# Patient Record
Sex: FEMALE | Race: WHITE | NOT HISPANIC OR LATINO | Employment: OTHER | URBAN - METROPOLITAN AREA
[De-identification: names, ages, dates, MRNs, and addresses within clinical notes are randomized per-mention and may not be internally consistent; named-entity substitution may affect disease eponyms.]

---

## 2022-06-30 ENCOUNTER — TELEPHONE (OUTPATIENT)
Dept: PAIN MEDICINE | Facility: CLINIC | Age: 87
End: 2022-06-30

## 2022-06-30 ENCOUNTER — CONSULT (OUTPATIENT)
Dept: PAIN MEDICINE | Facility: CLINIC | Age: 87
End: 2022-06-30
Payer: COMMERCIAL

## 2022-06-30 VITALS
WEIGHT: 110 LBS | BODY MASS INDEX: 23.09 KG/M2 | DIASTOLIC BLOOD PRESSURE: 60 MMHG | SYSTOLIC BLOOD PRESSURE: 140 MMHG | HEIGHT: 58 IN | TEMPERATURE: 98.2 F | HEART RATE: 75 BPM | RESPIRATION RATE: 19 BRPM

## 2022-06-30 DIAGNOSIS — M54.16 LUMBAR RADICULOPATHY: ICD-10-CM

## 2022-06-30 DIAGNOSIS — G89.4 CHRONIC PAIN SYNDROME: Primary | ICD-10-CM

## 2022-06-30 DIAGNOSIS — M54.41 CHRONIC BILATERAL LOW BACK PAIN WITH BILATERAL SCIATICA: ICD-10-CM

## 2022-06-30 DIAGNOSIS — M54.42 CHRONIC BILATERAL LOW BACK PAIN WITH BILATERAL SCIATICA: ICD-10-CM

## 2022-06-30 DIAGNOSIS — G89.29 CHRONIC BILATERAL LOW BACK PAIN WITH BILATERAL SCIATICA: ICD-10-CM

## 2022-06-30 DIAGNOSIS — M48.062 SPINAL STENOSIS OF LUMBAR REGION WITH NEUROGENIC CLAUDICATION: ICD-10-CM

## 2022-06-30 PROCEDURE — 99204 OFFICE O/P NEW MOD 45 MIN: CPT | Performed by: ANESTHESIOLOGY

## 2022-06-30 RX ORDER — AMIODARONE HYDROCHLORIDE 100 MG/1
100 TABLET ORAL DAILY
COMMUNITY

## 2022-06-30 RX ORDER — MELATONIN
1000 DAILY
COMMUNITY

## 2022-06-30 RX ORDER — VITAMIN E 268 MG
400 CAPSULE ORAL DAILY
COMMUNITY

## 2022-06-30 RX ORDER — UBIDECARENONE 75 MG
CAPSULE ORAL DAILY
COMMUNITY

## 2022-06-30 RX ORDER — FUROSEMIDE 40 MG/1
40 TABLET ORAL 2 TIMES DAILY
COMMUNITY

## 2022-06-30 RX ORDER — CLOPIDOGREL BISULFATE 75 MG/1
75 TABLET ORAL DAILY
COMMUNITY

## 2022-06-30 NOTE — PROGRESS NOTES
Assessment:  1  Chronic pain syndrome    2  Chronic bilateral low back pain with bilateral sciatica    3  Lumbar radiculopathy - Bilateral    4  Spinal stenosis of lumbar region with neurogenic claudication        Plan:  New Medications Ordered This Visit   Medications    amiodarone 100 mg tablet     Sig: Take 100 mg by mouth daily    furosemide (LASIX) 40 mg tablet     Sig: Take 40 mg by mouth 2 (two) times a day    clopidogrel (PLAVIX) 75 mg tablet     Sig: Take 75 mg by mouth daily    cyanocobalamin (VITAMIN B-12) 100 mcg tablet     Sig: Take by mouth daily    vitamin E, tocopherol, 400 units capsule     Sig: Take 400 Units by mouth daily    cholecalciferol (VITAMIN D3) 1,000 units tablet     Sig: Take 1,000 Units by mouth daily       My impressions and treatment recommendations were discussed in detail with the patient, who verbalized understanding and had no further questions  Patient presents the office with low back and bilateral leg pain  Patient has been evaluated by spinal surgeon, at this time the patient does not want to consider surgery  She is here to be evaluated for epidural injections  Exam and imaging correlate, therefore I find it reasonable to perform an L5-S1 lumbar epidural steroid injection  I educated patient that these injections will only help with the pain, and not with many symptoms that she is experiencing such as but not limited to any bladder or bowel incontinence, saddle numbness, and weakness  Patient is agreeable for an L5-S1 lumbar epidural steroid injection  Complete risks and benefits including bleeding, infection, tissue reaction, nerve injury and allergic reaction were discussed  The approach was demonstrated using models and literature was provided  Verbal and written consent was obtained  Follow-up is planned in 4 weeks after injection time or sooner as warranted  Discharge instructions were provided   I personally saw and examined the patient and I agree with the above discussed plan of care  History of Present Illness:    Preeti Bolden is a 80 y o  female who presents to [facility] for initial evaluation of the above stated pain complaints  The patient has a past medical and chronic pain history as outlined in the assessment section  She was referred by herself  Patient presents to the office with low back and bilateral leg pain that began 5 months ago  She states that the intensity of her pain has been moderate to severe with a pain rating of 9/10 on the verbal numeric pain scale  Her pain is nearly constantly in nature with 60 to 95% of the time  She describes her pain as burning, sharp, pins and needles, and pressure like  She states she has weakness in her lower extremities  She currently uses a cane or walker to help with ambulation  Patient states that her pain increases with Laying down, standing, bending, sitting, walking, exercise, relaxation, coughing, sneezing, and with bowel movements  Patient states that physical therapy and heat therapy have not provided any relief  Patient is currently using Tylenol and lidocaine patches which only provided temporary relief  Patient does not smoke tobacco or marijuana  Patient does not consume alcohol  She is currently on a blood thinning medication  And she does not have an allergy to latex  Patient has been evaluated by spinal surgery due to the patient 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  Patient states that she does have some stress incontinence and has noticed that she needs to be close to the bathroom when she feels the sudden urge to defecate otherwise she feels she may lose her bowels  Patient also does have saddle numbness and on exam she has left leg weakness  But at this time the patient does not want to have surgery she would like to try an epidural steroid injection to see if this would help with her pain      I educated patient that these injections will only help with the pain, and not with many symptoms that she is experiencing such as but not limited to any bladder or bowel incontinence, saddle numbness, and weakness  Patient is agreeable for an L5-S1 lumbar epidural steroid injection  Review of Systems:    Review of Systems   Constitutional: Negative for chills and fatigue  HENT: Positive for hearing loss  Negative for ear pain, mouth sores and sinus pressure  Eyes: Negative for pain, redness and visual disturbance  Respiratory: Positive for shortness of breath  Negative for wheezing  Cardiovascular: Positive for leg swelling  Negative for chest pain and palpitations  Gastrointestinal: Negative for abdominal pain and nausea  Endocrine: Negative for polyphagia  Genitourinary: Positive for frequency  Musculoskeletal: Positive for gait problem  Negative for arthralgias, back pain and neck pain  MUSCLE PAIN BOTH SIDES   Skin: Positive for rash  Negative for wound  Neurological: Negative for seizures and weakness  Psychiatric/Behavioral: Positive for dysphoric mood  Negative for sleep disturbance  There is no problem list on file for this patient  History reviewed  No pertinent past medical history  No past surgical history on file      Family History   Problem Relation Age of Onset    No Known Problems Mother     No Known Problems Father        Social History     Occupational History    Not on file   Tobacco Use    Smoking status: Never Smoker    Smokeless tobacco: Not on file   Vaping Use    Vaping Use: Never used   Substance and Sexual Activity    Alcohol use: Not Currently     Alcohol/week: 1 0 standard drink     Types: 1 Glasses of wine per week    Drug use: Never    Sexual activity: Not Currently         Current Outpatient Medications:     amiodarone 100 mg tablet, Take 100 mg by mouth daily, Disp: , Rfl:     cholecalciferol (VITAMIN D3) 1,000 units tablet, Take 1,000 Units by mouth daily, Disp: , Rfl:     clopidogrel (PLAVIX) 75 mg tablet, Take 75 mg by mouth daily, Disp: , Rfl:     cyanocobalamin (VITAMIN B-12) 100 mcg tablet, Take by mouth daily, Disp: , Rfl:     furosemide (LASIX) 40 mg tablet, Take 40 mg by mouth 2 (two) times a day, Disp: , Rfl:     vitamin E, tocopherol, 400 units capsule, Take 400 Units by mouth daily, Disp: , Rfl:     Not on File    Physical Exam:    /60   Pulse 75   Temp 98 2 °F (36 8 °C)   Resp 19   Ht 4' 10" (1 473 m)   Wt 49 9 kg (110 lb)   BMI 22 99 kg/m²     Constitutional: normal, well developed, well nourished, alert, in no distress and non-toxic and no overt pain behavior    Eyes: anicteric  HEENT: hard of hearing  Neck: supple, symmetric, trachea midline and no masses   Pulmonary:even and unlabored  Cardiovascular:No edema or pitting edema present  Skin:Normal without rashes or lesions and well hydrated  Psychiatric:Mood and affect appropriate  Neurologic:Cranial Nerves II-XII grossly intact  Musculoskeletal:antalgic and using rolator     Lumbar Spine Exam    Appearance:  Normal lordosis  Palpation/Tenderness:  left lumbar paraspinal tenderness  right lumbar paraspinal tenderness  Sensory:  diminished pin prick sensation, location: complete saddle numbness, pins and needles bilateral lower legs  Range of Motion:  Flexion:  No limitation  without pain  Extension:  Minimally limited  with pain  Lateral Flexion - Left:  Minimally limited  with pain  Lateral Flexion - Right:  No limitation  without pain  Rotation - Left:  No limitation  without pain  Rotation - Right:  No limitation  without pain  Motor Strength:  Left hip flexion:  4/5  Left hip extension:  4/5  Right hip flexion:  5/5  Right hip extension:  5/5  Left knee flexion:  5/5  Left knee extension:  5/5  Right knee flexion:  5/5  Reflexes:  Left Patellar:  absent   Right Patellar:  absent   Left Achilles:  1+   Right Achilles:  1+   Special Tests:  Left Straight Leg Test: positive  Right Straight Leg Test:  negative  Left Abdi's Maneuver:  positive  Right Abdi's Maneuver:  negative    Imaging      Media Information                    Document Information    Other:  Other   mri report of lumbar spine   06/30/2022   Attached To:   Consult on 6/30/22 with Jam Green MD     Source Information    lEissa Gusman  [facility]       No orders to display       No orders of the defined types were placed in this encounter

## 2022-06-30 NOTE — H&P (VIEW-ONLY)
Assessment:  1  Chronic pain syndrome    2  Chronic bilateral low back pain with bilateral sciatica    3  Lumbar radiculopathy - Bilateral    4  Spinal stenosis of lumbar region with neurogenic claudication        Plan:  New Medications Ordered This Visit   Medications    amiodarone 100 mg tablet     Sig: Take 100 mg by mouth daily    furosemide (LASIX) 40 mg tablet     Sig: Take 40 mg by mouth 2 (two) times a day    clopidogrel (PLAVIX) 75 mg tablet     Sig: Take 75 mg by mouth daily    cyanocobalamin (VITAMIN B-12) 100 mcg tablet     Sig: Take by mouth daily    vitamin E, tocopherol, 400 units capsule     Sig: Take 400 Units by mouth daily    cholecalciferol (VITAMIN D3) 1,000 units tablet     Sig: Take 1,000 Units by mouth daily       My impressions and treatment recommendations were discussed in detail with the patient, who verbalized understanding and had no further questions  Patient presents the office with low back and bilateral leg pain  Patient has been evaluated by spinal surgeon, at this time the patient does not want to consider surgery  She is here to be evaluated for epidural injections  Exam and imaging correlate, therefore I find it reasonable to perform an L5-S1 lumbar epidural steroid injection  I educated patient that these injections will only help with the pain, and not with many symptoms that she is experiencing such as but not limited to any bladder or bowel incontinence, saddle numbness, and weakness  Patient is agreeable for an L5-S1 lumbar epidural steroid injection  Complete risks and benefits including bleeding, infection, tissue reaction, nerve injury and allergic reaction were discussed  The approach was demonstrated using models and literature was provided  Verbal and written consent was obtained  Follow-up is planned in 4 weeks after injection time or sooner as warranted  Discharge instructions were provided   I personally saw and examined the patient and I agree with the above discussed plan of care  History of Present Illness:    Mikhail Riley is a 80 y o  female who presents to [facility] for initial evaluation of the above stated pain complaints  The patient has a past medical and chronic pain history as outlined in the assessment section  She was referred by herself  Patient presents to the office with low back and bilateral leg pain that began 5 months ago  She states that the intensity of her pain has been moderate to severe with a pain rating of 9/10 on the verbal numeric pain scale  Her pain is nearly constantly in nature with 60 to 95% of the time  She describes her pain as burning, sharp, pins and needles, and pressure like  She states she has weakness in her lower extremities  She currently uses a cane or walker to help with ambulation  Patient states that her pain increases with Laying down, standing, bending, sitting, walking, exercise, relaxation, coughing, sneezing, and with bowel movements  Patient states that physical therapy and heat therapy have not provided any relief  Patient is currently using Tylenol and lidocaine patches which only provided temporary relief  Patient does not smoke tobacco or marijuana  Patient does not consume alcohol  She is currently on a blood thinning medication  And she does not have an allergy to latex  Patient has been evaluated by spinal surgery due to the patient 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  Patient states that she does have some stress incontinence and has noticed that she needs to be close to the bathroom when she feels the sudden urge to defecate otherwise she feels she may lose her bowels  Patient also does have saddle numbness and on exam she has left leg weakness  But at this time the patient does not want to have surgery she would like to try an epidural steroid injection to see if this would help with her pain      I educated patient that these injections will only help with the pain, and not with many symptoms that she is experiencing such as but not limited to any bladder or bowel incontinence, saddle numbness, and weakness  Patient is agreeable for an L5-S1 lumbar epidural steroid injection  Review of Systems:    Review of Systems   Constitutional: Negative for chills and fatigue  HENT: Positive for hearing loss  Negative for ear pain, mouth sores and sinus pressure  Eyes: Negative for pain, redness and visual disturbance  Respiratory: Positive for shortness of breath  Negative for wheezing  Cardiovascular: Positive for leg swelling  Negative for chest pain and palpitations  Gastrointestinal: Negative for abdominal pain and nausea  Endocrine: Negative for polyphagia  Genitourinary: Positive for frequency  Musculoskeletal: Positive for gait problem  Negative for arthralgias, back pain and neck pain  MUSCLE PAIN BOTH SIDES   Skin: Positive for rash  Negative for wound  Neurological: Negative for seizures and weakness  Psychiatric/Behavioral: Positive for dysphoric mood  Negative for sleep disturbance  There is no problem list on file for this patient  History reviewed  No pertinent past medical history  No past surgical history on file      Family History   Problem Relation Age of Onset    No Known Problems Mother     No Known Problems Father        Social History     Occupational History    Not on file   Tobacco Use    Smoking status: Never Smoker    Smokeless tobacco: Not on file   Vaping Use    Vaping Use: Never used   Substance and Sexual Activity    Alcohol use: Not Currently     Alcohol/week: 1 0 standard drink     Types: 1 Glasses of wine per week    Drug use: Never    Sexual activity: Not Currently         Current Outpatient Medications:     amiodarone 100 mg tablet, Take 100 mg by mouth daily, Disp: , Rfl:     cholecalciferol (VITAMIN D3) 1,000 units tablet, Take 1,000 Units by mouth daily, Disp: , Rfl:     clopidogrel (PLAVIX) 75 mg tablet, Take 75 mg by mouth daily, Disp: , Rfl:     cyanocobalamin (VITAMIN B-12) 100 mcg tablet, Take by mouth daily, Disp: , Rfl:     furosemide (LASIX) 40 mg tablet, Take 40 mg by mouth 2 (two) times a day, Disp: , Rfl:     vitamin E, tocopherol, 400 units capsule, Take 400 Units by mouth daily, Disp: , Rfl:     Not on File    Physical Exam:    /60   Pulse 75   Temp 98 2 °F (36 8 °C)   Resp 19   Ht 4' 10" (1 473 m)   Wt 49 9 kg (110 lb)   BMI 22 99 kg/m²     Constitutional: normal, well developed, well nourished, alert, in no distress and non-toxic and no overt pain behavior    Eyes: anicteric  HEENT: hard of hearing  Neck: supple, symmetric, trachea midline and no masses   Pulmonary:even and unlabored  Cardiovascular:No edema or pitting edema present  Skin:Normal without rashes or lesions and well hydrated  Psychiatric:Mood and affect appropriate  Neurologic:Cranial Nerves II-XII grossly intact  Musculoskeletal:antalgic and using rolator     Lumbar Spine Exam    Appearance:  Normal lordosis  Palpation/Tenderness:  left lumbar paraspinal tenderness  right lumbar paraspinal tenderness  Sensory:  diminished pin prick sensation, location: complete saddle numbness, pins and needles bilateral lower legs  Range of Motion:  Flexion:  No limitation  without pain  Extension:  Minimally limited  with pain  Lateral Flexion - Left:  Minimally limited  with pain  Lateral Flexion - Right:  No limitation  without pain  Rotation - Left:  No limitation  without pain  Rotation - Right:  No limitation  without pain  Motor Strength:  Left hip flexion:  4/5  Left hip extension:  4/5  Right hip flexion:  5/5  Right hip extension:  5/5  Left knee flexion:  5/5  Left knee extension:  5/5  Right knee flexion:  5/5  Reflexes:  Left Patellar:  absent   Right Patellar:  absent   Left Achilles:  1+   Right Achilles:  1+   Special Tests:  Left Straight Leg Test: positive  Right Straight Leg Test:  negative  Left Abdi's Maneuver:  positive  Right Abdi's Maneuver:  negative    Imaging      Media Information                    Document Information    Other:  Other   mri report of lumbar spine   06/30/2022   Attached To:   Consult on 6/30/22 with Laurie Rodríguez MD     Source Information    Elissa Gusman  [facility]       No orders to display       No orders of the defined types were placed in this encounter

## 2022-06-30 NOTE — TELEPHONE ENCOUNTER
Scheduled patient for 7/14/22  Patient is taking Plavix  Hold order was faxed to Dr Arturo Toledo at [phone]  Phone # [phone]  Nothing to eat or drink 1 hour prior to procedure  Needs to arrange transportation  Proper clothing for procedure  No vaccines 2 weeks prior or after procedure  If ill or place on antibiotics, please call to reschedule      Please call the pt when the hold order is received with the instructions

## 2022-07-05 NOTE — TELEPHONE ENCOUNTER
Anticoagulant hold order scanned into the chart in the media section   Please call pt with instructions

## 2022-07-06 NOTE — TELEPHONE ENCOUNTER
S/w pt's daughter in law Adriana Damon, informed her that patient will have to hold her Plavix 7/7/22 through 7/14/22  Switched phone number on file to DIL

## 2022-07-06 NOTE — TELEPHONE ENCOUNTER
Rich Smalls Daughter in-law returning nurses phone call regarding procedure instructions     Please advise    [phone]

## 2022-07-14 ENCOUNTER — HOSPITAL ENCOUNTER (OUTPATIENT)
Facility: AMBULARY SURGERY CENTER | Age: 87
Setting detail: OUTPATIENT SURGERY
Discharge: HOME | End: 2022-07-14
Attending: ANESTHESIOLOGY | Admitting: ANESTHESIOLOGY
Payer: COMMERCIAL

## 2022-07-14 ENCOUNTER — APPOINTMENT (OUTPATIENT)
Dept: RADIOLOGY | Facility: HOSPITAL | Age: 87
End: 2022-07-14
Payer: COMMERCIAL

## 2022-07-14 VITALS
SYSTOLIC BLOOD PRESSURE: 157 MMHG | RESPIRATION RATE: 18 BRPM | TEMPERATURE: 98 F | HEART RATE: 76 BPM | DIASTOLIC BLOOD PRESSURE: 77 MMHG | OXYGEN SATURATION: 98 %

## 2022-07-14 PROCEDURE — 62323 NJX INTERLAMINAR LMBR/SAC: CPT | Performed by: ANESTHESIOLOGY

## 2022-07-14 RX ORDER — LIDOCAINE WITH 8.4% SOD BICARB 0.9%(10ML)
SYRINGE (ML) INJECTION AS NEEDED
Status: DISCONTINUED | OUTPATIENT
Start: 2022-07-14 | End: 2022-07-14 | Stop reason: HOSPADM

## 2022-07-14 RX ORDER — METHYLPREDNISOLONE ACETATE 80 MG/ML
INJECTION, SUSPENSION INTRA-ARTICULAR; INTRALESIONAL; INTRAMUSCULAR; SOFT TISSUE AS NEEDED
Status: DISCONTINUED | OUTPATIENT
Start: 2022-07-14 | End: 2022-07-14 | Stop reason: HOSPADM

## 2022-07-14 NOTE — INTERVAL H&P NOTE
H&P reviewed  After examining the patient I find no changes in the patients condition since the H&P had been written      Vitals:    07/14/22 1226   BP: 152/80   Pulse: 81   Resp: 18   Temp: 98 °F (36 7 °C)   SpO2: 100%

## 2022-07-14 NOTE — OP NOTE
ATTENDING PHYSICIAN:  Sue Ervin MD     PROCEDURE:  Lumbar interlaminar left paramedian epidural steroid injection with steroid and local anesthetic under fluoroscopy at the L5-S1 level  PREPROCEDURE DIAGNOSIS:  Low back pain  POSTPROCEDURE DIAGNOSIS:  Low back pain  ANESTHESIA:  Local     ESTIMATED BLOOD LOSS:  Minimal     COMPLICATIONS:  None  LOCATION:  [place] [address], [facility]  CONSENT:  Today's procedure, its potential benefits as well as its risks and potential side effects were reviewed  Discussed risks of the procedure including bleeding, infection, nerve irritation or damage, reactions to the medications, headache, failure of the pain to improve, and potential worsening of the pain were explained to the patient who verbalized understanding and who wished to proceed  Written informed consent was thereby obtained  DESCRIPTION OF THE PROCEDURE:  After written informed consent was obtained, the patient was taken to the fluoroscopy suite and placed in the prone position  Anatomical landmarks were identified by way of fluoroscopy in multiple views  The skin of the lumbar region was prepped and draped in the usual sterile fashion  Strict aseptic technique was utilized  The skin and subcutaneous tissues at the needle entry site were infiltrated with 3 mL of 1% preservative-free lidocaine using a 25-gauge 1-1/2-inch needle  A 20-gauge Tuohy needle was then incrementally advanced under fluoroscopy using a loss of resistance technique  Upon entering into the epidural space, a positive loss of resistance to air was noted and a characteristic "pop" was felt  Proper placement into the epidural space was confirmed with fluoroscopy in multiple views and by continued loss of resistance after injection of 1 mL of sterile preservative-free normal saline as well as the administration of contrast to delineate the epidural space  There were no paresthesias reported   After negative aspiration for CSF or heme, a 6 mL injectate consisting of 1 mL of Depo-Medrol 80 mg/mL mixed with 5 mL of preservative-free normal saline was slowly injected  The patient tolerated the procedure well and all needles were removed with the tips intact  Hemostasis was maintained  There were no apparent paresthesias or complications  The skin was wiped clean and a Band-Aid was placed as appropriate  The patient was monitored for an appropriate period of time following the procedure and remained hemodynamically stable and neurovascularly intact following the procedure  The patient was ultimately discharged to home with supervision in good condition and instructed to call the office in a few days for an update or sooner as warranted  I was present and participated in all key and critical portions of this procedure      Yuan Pino MD  7/14/2022  1:19 PM

## 2022-07-14 NOTE — DISCHARGE INSTRUCTIONS
Epidural Steroid Injection   WHAT YOU NEED TO KNOW:   An epidural steroid injection (JEFF) is a procedure to inject steroid medicine into the epidural space  The epidural space is between your spinal cord and vertebrae  Steroids reduce inflammation and fluid buildup in your spine that may be causing pain  You may be given pain medicine along with the steroids  ACTIVITY  Do not drive or operate machinery today  No strenuous activity today - bending, lifting, etc   You may resume normal activites starting tomorrow - start slowly and as tolerated  You may shower today, but no tub baths or hot tubs  You may have numbness for several hours from the local anesthetic  Please use caution and common sense, especially with weight-bearing activities  CARE OF THE INJECTION SITE  If you have soreness or pain, apply ice to the area today (20 minutes on/20 minutes off)  Starting tomorrow, you may use warm, moist heat or ice if needed  You may have an increase or change in your discomfort for 36-48 hours after your treatment  Apply ice and continue with any pain medication you have been prescribed  [facility] if you have any of the following: redness, drainage, swelling, headache, stiff neck or fever above 100°F     SPECIAL INSTRUCTIONS  Our office will contact you in approximately 7 days for a progress report  MEDICATIONS  Continue to take all routine medications  Our office may have instructed you to hold some medications  As no general anesthesia was used in today's procedure, you should not experience any side effects related to anesthesia  If you have a problem specifically related to your procedure, please call our office at [phone]  Problems not related to your procedure should be directed to your primary care physician   Epidural Steroid Injection   WHAT YOU NEED TO KNOW:   An epidural steroid injection (JEFF) is a procedure to inject steroid medicine into the epidural space  The epidural space is between your spinal cord and vertebrae  Steroids reduce inflammation and fluid buildup in your spine that may be causing pain  You may be given pain medicine along with the steroids  ACTIVITY  Do not drive or operate machinery today  No strenuous activity today - bending, lifting, etc   You may resume normal activites starting tomorrow - start slowly and as tolerated  You may shower today, but no tub baths or hot tubs  You may have numbness for several hours from the local anesthetic  Please use caution and common sense, especially with weight-bearing activities  CARE OF THE INJECTION SITE  If you have soreness or pain, apply ice to the area today (20 minutes on/20 minutes off)  Starting tomorrow, you may use warm, moist heat or ice if needed  You may have an increase or change in your discomfort for 36-48 hours after your treatment  Apply ice and continue with any pain medication you have been prescribed  Notify the [facility] if you have any of the following: redness, drainage, swelling, headache, stiff neck or fever above 100°F     SPECIAL INSTRUCTIONS  Our office will contact you in approximately 7 days for a progress report  MEDICATIONS  Continue to take all routine medications  Our office may have instructed you to hold some medications  As no general anesthesia was used in today's procedure, you should not experience any side effects related to anesthesia  If you have a problem specifically related to your procedure, please call our office at [phone]  Problems not related to your procedure should be directed to your primary care physician

## 2022-07-21 ENCOUNTER — TELEPHONE (OUTPATIENT)
Dept: PAIN MEDICINE | Facility: CLINIC | Age: 87
End: 2022-07-21

## 2022-08-01 NOTE — TELEPHONE ENCOUNTER
S/w pt. And advised of previous conversation with Eunice . (Ok pt speak to Eunice, YOVANA). Pt states that she is reluctant to take Tylenol as well because she has developed a rash on both arms. Pt advised to f/u with dermatology or PCP regarding rash.     Pt is questioning how long she has to wait for another injection. Pain has been off and on since injection.   Please advise

## 2022-08-01 NOTE — TELEPHONE ENCOUNTER
S/w pt's YOVANA Eunice. Pt is having the same and sometimes worse pain than she had before her previous injection on 7/14/22. Per YOVANA, pain is off and on but pt is reluctant  to take Tylenol d/t potentially damaging her liver and cannot take NSAIDs. Advised that pt can take Tylenol 1000 mg every 8 hrs with no more that 3000 mg /24 hrs.Pt has tried lidocaine patches with minimal relief. Heat helps at HS. YOVANA is requesting another injection for pt. Pt takes Plavix. Advised AS out of office, will be advised tomorrow and be contacted with recommendations.

## 2022-08-01 NOTE — TELEPHONE ENCOUNTER
Pt stated that she needs someone to call her that Eunice called us and pt does not know what is going on.    Pt # [phone]

## 2022-08-17 ENCOUNTER — OFFICE VISIT (OUTPATIENT)
Dept: PAIN MEDICINE | Facility: CLINIC | Age: 87
End: 2022-08-17
Payer: COMMERCIAL

## 2022-08-17 ENCOUNTER — TELEPHONE (OUTPATIENT)
Dept: PAIN MEDICINE | Facility: CLINIC | Age: 87
End: 2022-08-17

## 2022-08-17 VITALS
BODY MASS INDEX: 22.99 KG/M2 | TEMPERATURE: 98.2 F | HEIGHT: 58 IN | HEART RATE: 75 BPM | RESPIRATION RATE: 19 BRPM | DIASTOLIC BLOOD PRESSURE: 70 MMHG | SYSTOLIC BLOOD PRESSURE: 145 MMHG

## 2022-08-17 DIAGNOSIS — G89.29 CHRONIC BILATERAL LOW BACK PAIN WITH BILATERAL SCIATICA: ICD-10-CM

## 2022-08-17 DIAGNOSIS — M54.42 CHRONIC BILATERAL LOW BACK PAIN WITH BILATERAL SCIATICA: ICD-10-CM

## 2022-08-17 DIAGNOSIS — M48.062 SPINAL STENOSIS OF LUMBAR REGION WITH NEUROGENIC CLAUDICATION: ICD-10-CM

## 2022-08-17 DIAGNOSIS — M54.16 LUMBAR RADICULOPATHY: ICD-10-CM

## 2022-08-17 DIAGNOSIS — G89.4 CHRONIC PAIN SYNDROME: Primary | ICD-10-CM

## 2022-08-17 DIAGNOSIS — M54.41 CHRONIC BILATERAL LOW BACK PAIN WITH BILATERAL SCIATICA: ICD-10-CM

## 2022-08-17 PROCEDURE — 99214 OFFICE O/P EST MOD 30 MIN: CPT

## 2022-08-17 NOTE — TELEPHONE ENCOUNTER
Scheduled patient for 9/16/22  Patient is taking Plavix- hold order was faxed to Dr Sina Solis @ [phone]  Nothing to eat or drink 1 hour prior to procedure  Needs to arrange transportation  Proper clothing for procedure  No vaccines 2 weeks prior or after procedure  If ill or place on antibiotics, please call to reschedule    Please call when the hold order is recevied

## 2022-08-17 NOTE — PROGRESS NOTES
Pain Medicine Follow-Up Note    Assessment:  1  Chronic pain syndrome    2  Chronic bilateral low back pain with bilateral sciatica    3  Lumbar radiculopathy    4  Spinal stenosis of lumbar region with neurogenic claudication        Plan:  Orders Placed This Encounter   Procedures    PAT Covid Screening     Standing Status:   Future     Standing Expiration Date:   8/17/2023     Order Specific Question:   Is this test for diagnosis or screening? Answer:   Screening     Order Specific Question:   Symptomatic for COVID-19 as defined by CDC? Answer:   No     Order Specific Question:   Hospitalized for COVID-19? Answer:   No     Order Specific Question:   Admitted to ICU for COVID-19? Answer:   No     Order Specific Question:   Acknowledged by patient: Asymptomatic testing will not be billed to insurance  You will be billed $99 for this testing  Answer:   PAT screening     Order Specific Question:   Does the patient currently work in a healthcare setting with direct patient contact? Answer:   Unknown     Order Specific Question:   Is this a [facility] employee? Answer:   Unknown     Order Specific Question:   Resident in a congregate care setting? Answer:   Unknown           My impressions and treatment recommendations were discussed in detail with the patient who verbalized understanding and had no further questions  Patient is a 80-year-old female who presents the office stating that her pain is better however her pain score is an 8/10 on the verbal numeric pain scale  Patient was last seen 07/14/2022 for an L5-S1 lumbar epidural steroid injection  Patient states that she has received some relief with this injection however patient is hopeful that if we try another approach she will see improved benefits  Patient is still having pain in the bilateral low back and bilateral lower extremities    Patient states that she feels the last injection gave her more relief on the left side so patient is hoping to have a right-sided injection at this time  At this time I find it appropriate for the patient undergo a right L5-S1 transforaminal epidural steroid injection, I believe this will be more therapeutic for the patient  Patient is in agreement with this plan  Complete risks and benefits including bleeding, infection, tissue reaction, nerve injury and allergic reaction were discussed  The approach was demonstrated using models and literature was provided  Verbal and written consent was obtained  Follow-up is planned in 4 weeks after injection or sooner as warranted  Discharge instructions were provided  I personally saw and examined the patient and I agree with the above discussed plan of care  History of Present Illness:    Belen Baig is a 80 y o  female who presents to [facility] for interval re-evaluation of the above stated pain complaints  The patient has a past medical and chronic pain history as outlined in the assessment section  She was last seen on 7/14/2022 for an L5-S1 LESI  At today's visit the patient states that her pain symptoms are better but with a pain score of 8/10 on the verbal numeric pain scale  Patient recently underwent a LESI and she feels that this has been beneficial and she feels that the improvement is ongoing  Her pain is worse in the morning  The patient's pain is occasional in nature  And the quality of her pain is dull-aching  At this time the patient states that she is not receiving significant amounts of relief to provide a difference in her life stating that the percentage of the relief of her pain so far has been approximately 20%  Patient states that she has received some relief with this injection however patient is hopeful that if we try another approach she will see improved benefits  Patient is still having pain in the bilateral low back and bilateral lower extremities    Patient describes the pain as electrical shocks going down of her legs Patient states that she feels the last injection gave her more relief on the left side so patient is hoping to have a right-sided injection at this time  At this time I find it appropriate for the patient undergo a right L5-S1 transforaminal epidural steroid injection, I believe this will be more therapeutic for the patient  I discussed with the patient that due to her severe nature of her spine that epidural steroid injections will only help with pain and not her other symptoms at this time  And patient still does not want to undergo any surgical procedures  Patient is in agreement with this plan  Other than as stated above, the patient denies any interval changes in medications, medical condition, mental condition, symptoms, or allergies since the last office visit  Review of Systems:    Review of Systems   Constitutional: Negative for unexpected weight change  HENT: Negative for ear pain  Eyes: Negative for visual disturbance  Respiratory: Positive for shortness of breath  Negative for wheezing  Gastrointestinal: Negative for abdominal pain  Musculoskeletal: Positive for back pain and joint swelling  Decreased ROM, muscle weakness in leds    pain bilat in lower legs   Skin: Positive for rash  Neurological: Negative for weakness and numbness  Psychiatric/Behavioral: Negative for decreased concentration  There is no problem list on file for this patient  Past Medical History:   Diagnosis Date    Hypertension        Past Surgical History:   Procedure Laterality Date    EPIDURAL BLOCK INJECTION Left 07/14/2022    Procedure: L5 S1 LUMBAR EPIDURAL STEROID INJECTION (64933);   Surgeon: Fabricio Modi MD;  Location: [facility] MAIN OR;  Service: Pain Management     WISDOM TOOTH EXTRACTION Bilateral        Family History   Problem Relation Age of Onset    No Known Problems Mother     No Known Problems Father        Social History Occupational History    Not on file   Tobacco Use    Smoking status: Never Smoker    Smokeless tobacco: Never Used   Vaping Use    Vaping Use: Never used   Substance and Sexual Activity    Alcohol use: Not Currently     Alcohol/week: 1 0 standard drink     Types: 1 Glasses of wine per week    Drug use: Never    Sexual activity: Not Currently         Current Outpatient Medications:     amiodarone 100 mg tablet, Take 100 mg by mouth daily, Disp: , Rfl:     cholecalciferol (VITAMIN D3) 1,000 units tablet, Take 1,000 Units by mouth daily, Disp: , Rfl:     clopidogrel (PLAVIX) 75 mg tablet, Take 75 mg by mouth daily, Disp: , Rfl:     cyanocobalamin (VITAMIN B-12) 100 mcg tablet, Take by mouth daily, Disp: , Rfl:     furosemide (LASIX) 40 mg tablet, Take 40 mg by mouth 2 (two) times a day, Disp: , Rfl:     vitamin E, tocopherol, 400 units capsule, Take 400 Units by mouth daily, Disp: , Rfl:     Allergies   Allergen Reactions    Aspirin Rash    Codeine Rash    Penicillins Rash    Sulfa Antibiotics Rash       Physical Exam:    /70   Pulse 75   Temp 98 2 °F (36 8 °C)   Resp 19   Ht 4' 10" (1 473 m)   BMI 22 99 kg/m²     Constitutional:normal, well developed, well nourished, alert, in no distress and non-toxic and no overt pain behavior    Eyes:anicteric  HEENT:grossly intact  Neck:supple, symmetric, trachea midline and no masses   Pulmonary:even and unlabored  Cardiovascular:Mild edema bilaterally lower extremities  Skin:Normal without rashes or lesions and well hydrated  Psychiatric:Mood and affect appropriate  Neurologic:Cranial Nerves II-XII grossly intact  Musculoskeletal:antalgic and Ambulates with a Rollator          Orders Placed This Encounter   Procedures    PAT Covid Screening

## 2022-08-25 NOTE — TELEPHONE ENCOUNTER
S/w pt's DIL [facility] consent given) and advised hold approved by Dr Omar Baker    Pt is to hold Plavix from 9/9 to 9/16  Pt will be advised to resume after the procedure  Per Talisha Salomon, no need to review pre procedure instructions  Advised to CB with any questions or concerns

## 2022-09-08 NOTE — TELEPHONE ENCOUNTER
DIL called in to  Cancel the procedure and will call back to reschedule   Please be advised thank you

## 2022-09-08 NOTE — TELEPHONE ENCOUNTER
Rescheduled for 10/21/22  Saji Cooper was placed on Eliquis for a few weeks and will be back on Plavix  Do we need a new order from Dr Lee Yates?

## 2022-09-08 NOTE — TELEPHONE ENCOUNTER
I reviewed the eliquis hold in media and they are good for 60 days, so her hold is still good for her 10/21/22 proc since it was received back from the provider on 8/25/22  Does not require a new order

## 2022-10-21 ENCOUNTER — APPOINTMENT (OUTPATIENT)
Dept: RADIOLOGY | Facility: HOSPITAL | Age: 87
End: 2022-10-21
Payer: COMMERCIAL

## 2022-10-21 ENCOUNTER — HOSPITAL ENCOUNTER (OUTPATIENT)
Facility: AMBULARY SURGERY CENTER | Age: 87
Setting detail: OUTPATIENT SURGERY
Discharge: HOME | End: 2022-10-21
Attending: ANESTHESIOLOGY | Admitting: ANESTHESIOLOGY
Payer: COMMERCIAL

## 2022-10-21 VITALS
TEMPERATURE: 96.9 F | SYSTOLIC BLOOD PRESSURE: 171 MMHG | HEART RATE: 82 BPM | RESPIRATION RATE: 18 BRPM | DIASTOLIC BLOOD PRESSURE: 88 MMHG | OXYGEN SATURATION: 96 %

## 2022-10-21 PROCEDURE — 64483 NJX AA&/STRD TFRM EPI L/S 1: CPT | Performed by: ANESTHESIOLOGY

## 2022-10-21 PROCEDURE — 64484 NJX AA&/STRD TFRM EPI L/S EA: CPT | Performed by: ANESTHESIOLOGY

## 2022-10-21 RX ORDER — BUPIVACAINE HYDROCHLORIDE 2.5 MG/ML
INJECTION, SOLUTION EPIDURAL; INFILTRATION; INTRACAUDAL AS NEEDED
Status: DISCONTINUED | OUTPATIENT
Start: 2022-10-21 | End: 2022-10-21 | Stop reason: HOSPADM

## 2022-10-21 RX ORDER — LIDOCAINE HYDROCHLORIDE 10 MG/ML
INJECTION, SOLUTION INFILTRATION; PERINEURAL AS NEEDED
Status: DISCONTINUED | OUTPATIENT
Start: 2022-10-21 | End: 2022-10-21 | Stop reason: HOSPADM

## 2022-10-21 RX ORDER — METHYLPREDNISOLONE ACETATE 80 MG/ML
INJECTION, SUSPENSION INTRA-ARTICULAR; INTRALESIONAL; INTRAMUSCULAR; SOFT TISSUE AS NEEDED
Status: DISCONTINUED | OUTPATIENT
Start: 2022-10-21 | End: 2022-10-21 | Stop reason: HOSPADM

## 2022-10-21 NOTE — DISCHARGE INSTRUCTIONS
Colorectal Surgery Epidural Steroid Injection   WHAT YOU NEED TO KNOW:   An epidural steroid injection (JEFF) is a procedure to inject steroid medicine into the epidural space  The epidural space is between your spinal cord and vertebrae  Steroids reduce inflammation and fluid buildup in your spine that may be causing pain  You may be given pain medicine along with the steroids  ACTIVITY  Do not drive or operate machinery today  No strenuous activity today - bending, lifting, etc   You may resume normal activites starting tomorrow - start slowly and as tolerated  You may shower today, but no tub baths or hot tubs  You may have numbness for several hours from the local anesthetic  Please use caution and common sense, especially with weight-bearing activities  CARE OF THE INJECTION SITE  If you have soreness or pain, apply ice to the area today (20 minutes on/20 minutes off)  Starting tomorrow, you may use warm, moist heat or ice if needed  You may have an increase or change in your discomfort for 36-48 hours after your treatment  Apply ice and continue with any pain medication you have been prescribed  Notify the [facility] if you have any of the following: redness, drainage, swelling, headache, stiff neck or fever above 100°F     SPECIAL INSTRUCTIONS  Our office will contact you in approximately 7 days for a progress report  MEDICATIONS  Continue to take all routine medications  Our office may have instructed you to hold some medications  As no general anesthesia was used in today's procedure, you should not experience any side effects related to anesthesia  If you are diabetic, the steroids used in today's injection may temporarily increase your blood sugar levels after the first few days after your injection  Please keep a close eye on your sugars and alert the doctor who manages your diabetes if your sugars are significantly high from your baseline or you are symptomatic       If you have a problem specifically related to your procedure, please call our office at [phone]  Problems not related to your procedure should be directed to your primary care physician

## 2022-10-21 NOTE — H&P
History of Present Illness: The patient is a 80 y o  female who presents with complaints of low back pain and right lower extremity radiculopathy  Past Medical History:   Diagnosis Date   • Hypertension        Past Surgical History:   Procedure Laterality Date   • EPIDURAL BLOCK INJECTION Left 07/14/2022    Procedure: L5 S1 LUMBAR EPIDURAL STEROID INJECTION (17557); Surgeon: Di Hale MD;  Location: [facility] MAIN OR;  Service: Pain Management    • WISDOM TOOTH EXTRACTION Bilateral        No current facility-administered medications for this encounter  Allergies   Allergen Reactions   • Aspirin Rash   • Codeine Rash   • Penicillins Rash   • Sulfa Antibiotics Rash       Physical Exam:   Vitals:    10/21/22 0924   BP: 137/63   Pulse: 81   Resp: 18   Temp: (!) 96 9 °F (36 1 °C)   SpO2: 100%     General: Awake, Alert, Oriented x 3, Mood and affect appropriate  Respiratory: Respirations even and unlabored  Cardiovascular: Peripheral pulses intact; no edema  Musculoskeletal Exam:  Tenderness in lumbar spine region    ASA Score: 2    Patient/Chart Verification  Patient ID Verified: Verbal, Armband  ID Band Applied: Yes  Consents Confirmed: Procedural  H&P( within 30 days) Verified: Yes  Interval H&P(within 24 hr) Complete (required for Outpatients and Surgery Admit only): Yes  Beta Blocker given : No  Pre-op Lab/Test Results Available: N/A  Pregnancy Lab Collected: N/A comment  Does Patient Have a Prosthetic Device/Implant: No    Assessment:  Low back pain and right lower extremity radiculopathy      Plan:  Proceed with right L5 and S1 transforaminal epidural steroid injection

## 2022-10-28 ENCOUNTER — TELEPHONE (OUTPATIENT)
Dept: PAIN MEDICINE | Facility: CLINIC | Age: 87
End: 2022-10-28

## 2022-10-28 NOTE — TELEPHONE ENCOUNTER
Pts grandson said she is doing better post inj   He said he just spoke with her this morning and shes doing good

## 2022-11-23 ENCOUNTER — TELEPHONE (OUTPATIENT)
Dept: PAIN MEDICINE | Facility: CLINIC | Age: 87
End: 2022-11-23

## 2022-11-23 ENCOUNTER — OFFICE VISIT (OUTPATIENT)
Dept: PAIN MEDICINE | Facility: CLINIC | Age: 87
End: 2022-11-23

## 2022-11-23 VITALS
RESPIRATION RATE: 18 BRPM | BODY MASS INDEX: 23.09 KG/M2 | HEART RATE: 87 BPM | HEIGHT: 58 IN | TEMPERATURE: 98.2 F | DIASTOLIC BLOOD PRESSURE: 75 MMHG | WEIGHT: 110 LBS | SYSTOLIC BLOOD PRESSURE: 122 MMHG

## 2022-11-23 DIAGNOSIS — G89.4 CHRONIC PAIN SYNDROME: Primary | ICD-10-CM

## 2022-11-23 DIAGNOSIS — N18.32 STAGE 3B CHRONIC KIDNEY DISEASE (HCC): ICD-10-CM

## 2022-11-23 DIAGNOSIS — M54.16 LUMBAR RADICULOPATHY: ICD-10-CM

## 2022-11-23 DIAGNOSIS — M48.062 SPINAL STENOSIS OF LUMBAR REGION WITH NEUROGENIC CLAUDICATION: ICD-10-CM

## 2022-11-23 DIAGNOSIS — M46.1 SACROILIITIS (HCC): ICD-10-CM

## 2022-11-23 DIAGNOSIS — M51.26 LUMBAR DISC HERNIATION: ICD-10-CM

## 2022-11-23 NOTE — H&P (VIEW-ONLY)
Pain Medicine Follow-Up Note    Assessment:  1  Chronic pain syndrome    2  Lumbar disc herniation    3  Spinal stenosis of lumbar region with neurogenic claudication    4  Sacroiliitis (HCC)    5  Stage 3b chronic kidney disease ([address] )    6  Lumbar radiculopathy        Plan:    My impressions and treatment recommendations were discussed in detail with the patient who verbalized understanding and had no further questions  Patient is a pleasant 41-year-old female that  presents the office following up on a right L5-S1 transforaminal epidural steroid injection that the patient had on 10/21/2022  Patient reports that initially she had excellent pain relief however she is currently has a pain score of 10/10 on the verbal numeric pain scale  Due to the patient's advanced age and kidney functions I am unable to prescribe the patient any oral medications at this time  Patient has severe to critical spinal canal stenosis with compression of the cauda equina nerve roots  On exam the patient describes severe restless legs syndrome that is persistent, I would typically prescribed gabapentin however it is contraindicated  I will reach out to patient's PCP about patient's needs to see if she has any suggestions  Patient has left sacroiliitis that is reproducible with provocative maneuvers therefore I recommend the patient have a sacroiliac joint injection  As well as the patient reports that her pain follows an L5 dermatomal pattern and had excellent results initially therefore I recommend the patient have a repeat right L5-S1 epidural steroid injection at this time  Patient reports that she has an appointment with dermatology due to bilateral arm rash that the patient states began when she started using Tylenol more frequently  Patient states that she uses 2 tablets of Tylenol a day patient states that she has both the Tylenol 350 mg and the Tylenol 650 mg tablets at home    However she also reports that they are not providing her much relief but she does not want to take more in make her rash worse  I will order a left sacroiliac joint injection along with a right L5-S1 transforaminal epidural steroid injection which should be  by 2 weeks' time and I will reach out to the patient's PCP for recommendations on her restless leg syndrome  Complete risks and benefits including bleeding, infection, tissue reaction, nerve injury and allergic reaction were discussed  The approach was demonstrated using models and literature was provided  Verbal and written consent was obtained  Follow-up is planned in 4 weeks after injection time or sooner as warranted  Discharge instructions were provided  I personally saw and examined the patient and I agree with the above discussed plan of care  History of Present Illness:    Clayton Estrada is a 80 y o  female who presents to [facility] for interval re-evaluation of the above stated pain complaints  The patient has a past medical and chronic pain history as outlined in the assessment section  She was last seen on 10/21/2022  At today's visit patient states that her pain score is a 10/10 on the verbal numeric pain scale  Patient had an epidural steroid injection on 10/21/2022 which the patient and patient's family report provided her excellent pain relief however it did not last very long maybe a couple weeks  Patient states that her pain is worse in the morning, evening, and at night  The patient's pain is constant in nature  The quality of the patient's pain is dull-aching, sharp, and shooting  Patient also describes that in her entire length of her legs she has a crawling feeling under the skin  Other than as stated above, the patient denies any interval changes in medications, medical condition, mental condition, symptoms, or allergies since the last office visit           Review of Systems:    Review of Systems   Constitutional: Negative for unexpected weight change  HENT: Negative for ear pain  Eyes: Negative for visual disturbance  Respiratory: Negative for shortness of breath and wheezing  Gastrointestinal: Negative for abdominal pain  Musculoskeletal: Positive for back pain, gait problem and joint swelling  Pain in left hip, B/l leg  Restless legs   Neurological: Negative for weakness and numbness  Psychiatric/Behavioral: Positive for sleep disturbance  Negative for decreased concentration  Past Medical History:   Diagnosis Date   • Hypertension        Past Surgical History:   Procedure Laterality Date   • EPIDURAL BLOCK INJECTION Left 07/14/2022    Procedure: L5 S1 LUMBAR EPIDURAL STEROID INJECTION (95602);   Surgeon: Vincent Klinefelter, MD;  Location: [facility] MAIN OR;  Service: Pain Management    • EPIDURAL BLOCK INJECTION Right 10/21/2022    Procedure: BLOCK / INJECTION EPIDURAL STEROID TRANSFORAMINAL  Right L5/S1 TFESI;  Surgeon: Vincent Klinefelter, MD;  Location: [facility] MAIN OR;  Service: Pain Management    • WISDOM TOOTH EXTRACTION Bilateral        Family History   Problem Relation Age of Onset   • No Known Problems Mother    • No Known Problems Father        Social History     Occupational History   • Not on file   Tobacco Use   • Smoking status: Never   • Smokeless tobacco: Never   Vaping Use   • Vaping Use: Never used   Substance and Sexual Activity   • Alcohol use: Not Currently     Alcohol/week: 1 0 standard drink     Types: 1 Glasses of wine per week   • Drug use: Never   • Sexual activity: Not Currently         Current Outpatient Medications:   •  amiodarone 100 mg tablet, Take 100 mg by mouth daily, Disp: , Rfl:   •  cholecalciferol (VITAMIN D3) 1,000 units tablet, Take 1,000 Units by mouth daily, Disp: , Rfl:   •  clopidogrel (PLAVIX) 75 mg tablet, Take 75 mg by mouth daily, Disp: , Rfl:   •  cyanocobalamin (VITAMIN B-12) 100 mcg tablet, Take by mouth daily, Disp: , Rfl:   •  furosemide (LASIX) 40 mg tablet, Take 40 mg by mouth 2 (two) times a day, Disp: , Rfl:   •  vitamin E, tocopherol, 400 units capsule, Take 400 Units by mouth daily, Disp: , Rfl:     Allergies   Allergen Reactions   • Aspirin Rash   • Codeine Rash   • Penicillins Rash   • Sulfa Antibiotics Rash       Physical Exam:    /75   Pulse 87   Temp 98 2 °F (36 8 °C)   Resp 18   Ht 4' 10" (1 473 m)   Wt 49 9 kg (110 lb)   BMI 22 99 kg/m²     Constitutional:normal, well developed, well nourished, alert, in no distress and non-toxic and no overt pain behavior    Eyes:anicteric  HEENT:grossly intact  Neck:supple, symmetric, trachea midline and no masses   Pulmonary:even and unlabored  Cardiovascular:No edema or pitting edema present  Skin: Rash and bruising (plavix) on bilateral arms  Psychiatric:Mood and affect appropriate  Neurologic:Cranial Nerves II-XII grossly intact  Musculoskeletal:antalgic, shuffling, stooped posture and Ambulates with a Rollator    Lumbar Spine Exam    Appearance:  Normal lordosis  Palpation/Tenderness:  left sacroiliac joint tenderness  Special Tests:  Left modified straight leg:  Positive   Right modified straight leg:  Positive   Left modified AHMET: Positive

## 2022-11-23 NOTE — PROGRESS NOTES
Pain Medicine Follow-Up Note    Assessment:  1  Chronic pain syndrome    2  Lumbar disc herniation    3  Spinal stenosis of lumbar region with neurogenic claudication    4  Sacroiliitis (HCC)    5  Stage 3b chronic kidney disease ([address] )    6  Lumbar radiculopathy        Plan:    My impressions and treatment recommendations were discussed in detail with the patient who verbalized understanding and had no further questions  Patient is a pleasant 78-year-old female that  presents the office following up on a right L5-S1 transforaminal epidural steroid injection that the patient had on 10/21/2022  Patient reports that initially she had excellent pain relief however she is currently has a pain score of 10/10 on the verbal numeric pain scale  Due to the patient's advanced age and kidney functions I am unable to prescribe the patient any oral medications at this time  Patient has severe to critical spinal canal stenosis with compression of the cauda equina nerve roots  On exam the patient describes severe restless legs syndrome that is persistent, I would typically prescribed gabapentin however it is contraindicated  I will reach out to patient's PCP about patient's needs to see if she has any suggestions  Patient has left sacroiliitis that is reproducible with provocative maneuvers therefore I recommend the patient have a sacroiliac joint injection  As well as the patient reports that her pain follows an L5 dermatomal pattern and had excellent results initially therefore I recommend the patient have a repeat right L5-S1 epidural steroid injection at this time  Patient reports that she has an appointment with dermatology due to bilateral arm rash that the patient states began when she started using Tylenol more frequently  Patient states that she uses 2 tablets of Tylenol a day patient states that she has both the Tylenol 350 mg and the Tylenol 650 mg tablets at home    However she also reports that they are not providing her much relief but she does not want to take more in make her rash worse  I will order a left sacroiliac joint injection along with a right L5-S1 transforaminal epidural steroid injection which should be  by 2 weeks' time and I will reach out to the patient's PCP for recommendations on her restless leg syndrome  Complete risks and benefits including bleeding, infection, tissue reaction, nerve injury and allergic reaction were discussed  The approach was demonstrated using models and literature was provided  Verbal and written consent was obtained  Follow-up is planned in 4 weeks after injection time or sooner as warranted  Discharge instructions were provided  I personally saw and examined the patient and I agree with the above discussed plan of care  History of Present Illness:    Arnav Garcia is a 80 y o  female who presents to [facility] for interval re-evaluation of the above stated pain complaints  The patient has a past medical and chronic pain history as outlined in the assessment section  She was last seen on 10/21/2022  At today's visit patient states that her pain score is a 10/10 on the verbal numeric pain scale  Patient had an epidural steroid injection on 10/21/2022 which the patient and patient's family report provided her excellent pain relief however it did not last very long maybe a couple weeks  Patient states that her pain is worse in the morning, evening, and at night  The patient's pain is constant in nature  The quality of the patient's pain is dull-aching, sharp, and shooting  Patient also describes that in her entire length of her legs she has a crawling feeling under the skin  Other than as stated above, the patient denies any interval changes in medications, medical condition, mental condition, symptoms, or allergies since the last office visit           Review of Systems:    Review of Systems   Constitutional: Negative for unexpected weight change  HENT: Negative for ear pain  Eyes: Negative for visual disturbance  Respiratory: Negative for shortness of breath and wheezing  Gastrointestinal: Negative for abdominal pain  Musculoskeletal: Positive for back pain, gait problem and joint swelling  Pain in left hip, B/l leg  Restless legs   Neurological: Negative for weakness and numbness  Psychiatric/Behavioral: Positive for sleep disturbance  Negative for decreased concentration  Past Medical History:   Diagnosis Date   • Hypertension        Past Surgical History:   Procedure Laterality Date   • EPIDURAL BLOCK INJECTION Left 07/14/2022    Procedure: L5 S1 LUMBAR EPIDURAL STEROID INJECTION (12964);   Surgeon: Veronica Peters MD;  Location: [facility] MAIN OR;  Service: Pain Management    • EPIDURAL BLOCK INJECTION Right 10/21/2022    Procedure: BLOCK / INJECTION EPIDURAL STEROID TRANSFORAMINAL  Right L5/S1 TFESI;  Surgeon: Veronica Peters MD;  Location: [facility] MAIN OR;  Service: Pain Management    • WISDOM TOOTH EXTRACTION Bilateral        Family History   Problem Relation Age of Onset   • No Known Problems Mother    • No Known Problems Father        Social History     Occupational History   • Not on file   Tobacco Use   • Smoking status: Never   • Smokeless tobacco: Never   Vaping Use   • Vaping Use: Never used   Substance and Sexual Activity   • Alcohol use: Not Currently     Alcohol/week: 1 0 standard drink     Types: 1 Glasses of wine per week   • Drug use: Never   • Sexual activity: Not Currently         Current Outpatient Medications:   •  amiodarone 100 mg tablet, Take 100 mg by mouth daily, Disp: , Rfl:   •  cholecalciferol (VITAMIN D3) 1,000 units tablet, Take 1,000 Units by mouth daily, Disp: , Rfl:   •  clopidogrel (PLAVIX) 75 mg tablet, Take 75 mg by mouth daily, Disp: , Rfl:   •  cyanocobalamin (VITAMIN B-12) 100 mcg tablet, Take by mouth daily, Disp: , Rfl:   •  furosemide (LASIX) 40 mg tablet, Take 40 mg by mouth 2 (two) times a day, Disp: , Rfl:   •  vitamin E, tocopherol, 400 units capsule, Take 400 Units by mouth daily, Disp: , Rfl:     Allergies   Allergen Reactions   • Aspirin Rash   • Codeine Rash   • Penicillins Rash   • Sulfa Antibiotics Rash       Physical Exam:    /75   Pulse 87   Temp 98 2 °F (36 8 °C)   Resp 18   Ht 4' 10" (1 473 m)   Wt 49 9 kg (110 lb)   BMI 22 99 kg/m²     Constitutional:normal, well developed, well nourished, alert, in no distress and non-toxic and no overt pain behavior    Eyes:anicteric  HEENT:grossly intact  Neck:supple, symmetric, trachea midline and no masses   Pulmonary:even and unlabored  Cardiovascular:No edema or pitting edema present  Skin: Rash and bruising (plavix) on bilateral arms  Psychiatric:Mood and affect appropriate  Neurologic:Cranial Nerves II-XII grossly intact  Musculoskeletal:antalgic, shuffling, stooped posture and Ambulates with a Rollator    Lumbar Spine Exam    Appearance:  Normal lordosis  Palpation/Tenderness:  left sacroiliac joint tenderness  Special Tests:  Left modified straight leg:  Positive   Right modified straight leg:  Positive   Left modified AHMET: Positive

## 2022-11-23 NOTE — TELEPHONE ENCOUNTER
Scheduled patient for TFESI on 12/7/22  Patient is taking Plavix   Faxed hold order to Dr Sheree Thibodeaux office at [phone]  Nothing to eat or drink 1 hour prior to procedure  Needs to arrange transportation  Proper clothing for procedure  No vaccines 2 weeks prior or after procedure  If ill or place on antibiotics, please call to reschedule      Please call with hold insturction      Scheduled pt for SIJ for 12/28/22  Went over pre-procedure instructions below:  Nothing to eat or drink 1 hr prior to procedure  Need to arrange transportation  Proper clothing for procedure  No vaccines 2 weeks prior or after procedure  If ill or placed on antibiotics please call to reschedule

## 2022-11-23 NOTE — PATIENT INSTRUCTIONS
Sacroiliac Joint Injection   WHAT YOU NEED TO KNOW:   What do I need to know about a sacroiliac joint injection? A sacroiliac (SI) joint injection is done to diagnose or treat pain from sacroiliac joint syndrome  The pain caused by this syndrome may be felt in your lower back, buttocks, groin, and your thigh  How do I prepare for an SI injection? Your healthcare provider will ask you to not take any pain medicine the day of the injection  Ask him if there are any other medicines you should not take  You will need to find someone to drive you home after your procedure  What will happen during the SI injection? You will be awake for your injection  You may be given calming medicine if you are anxious  You will lie on your stomach with a pillow under your abdomen  Your healthcare provider will give you an injection of medicine to numb the area  He may use an x-ray, ultrasound, or CT scan to find the area to inject  You may also be given an injection of contrast material to help your SI joint show up better  Then, your healthcare provider will inject local anesthesia, antiinflammatory medicine, or both into your SI joint  Healthcare providers will watch you closely for any problems for up to 30 minutes after your injection  Your healthcare provider will check to see if your pain decreases after the injection  What are the risks of an SI injection? You may have some weakness for a short time after your injection  The SI injection can cause bleeding, infection, and pain  It can also cause temporary weakness in your leg and problems urinating  You may have an allergic reaction to the medicine that is injected into your SI joint  Your pain may return and you may need more treatment  CARE AGREEMENT:   You have the right to help plan your care  Learn about your health condition and how it may be treated  Discuss treatment options with your healthcare providers to decide what care you want to receive   You always have the right to refuse treatment  The above information is an  only  It is not intended as medical advice for individual conditions or treatments  Talk to your doctor, nurse or pharmacist before following any medical regimen to see if it is safe and effective for you  © Copyright [employer] 2022 Information is for End User's use only and may not be sold, redistributed or otherwise used for commercial purposes   All illustrations and images included in CareNotes® are the copyrighted property of A D A M , Inc  or [address] [place] [address]

## 2022-11-25 NOTE — TELEPHONE ENCOUNTER
Called cell # and [facility] answered  Advised do not see SARATH on chart  She stated to call the pt and call her back  Called home # and Ever Tavarez answered  He gave phone # [phone] for pt  Called pt and S/W pt  She stated RN can S/W [facility]  Advised pt her last dose of Plavix is on 11/29 and to start holding it on 11/30 for procedure on 12/7  Pt stated she does not take Xarelto  Advised will call [facility] too  She verbalized understanding  S/W [facility]  Advised her of the above  She stated also the pt does not take Xarelto and have told people that  Advised will remove it from her medication list   She verbalized understanding

## 2022-12-07 ENCOUNTER — APPOINTMENT (OUTPATIENT)
Dept: RADIOLOGY | Facility: HOSPITAL | Age: 87
End: 2022-12-07

## 2022-12-07 ENCOUNTER — HOSPITAL ENCOUNTER (OUTPATIENT)
Facility: AMBULARY SURGERY CENTER | Age: 87
Setting detail: OUTPATIENT SURGERY
Discharge: HOME | End: 2022-12-07
Attending: PHYSICAL MEDICINE & REHABILITATION | Admitting: PHYSICAL MEDICINE & REHABILITATION

## 2022-12-07 VITALS
DIASTOLIC BLOOD PRESSURE: 81 MMHG | OXYGEN SATURATION: 100 % | SYSTOLIC BLOOD PRESSURE: 180 MMHG | HEART RATE: 78 BPM | TEMPERATURE: 96.5 F | RESPIRATION RATE: 16 BRPM

## 2022-12-07 RX ORDER — DEXAMETHASONE SODIUM PHOSPHATE 10 MG/ML
INJECTION, SOLUTION INTRAMUSCULAR; INTRAVENOUS AS NEEDED
Status: DISCONTINUED | OUTPATIENT
Start: 2022-12-07 | End: 2022-12-07 | Stop reason: HOSPADM

## 2022-12-07 RX ORDER — BUPIVACAINE HYDROCHLORIDE 2.5 MG/ML
INJECTION, SOLUTION EPIDURAL; INFILTRATION; INTRACAUDAL AS NEEDED
Status: DISCONTINUED | OUTPATIENT
Start: 2022-12-07 | End: 2022-12-07 | Stop reason: HOSPADM

## 2022-12-07 RX ORDER — LIDOCAINE HYDROCHLORIDE 10 MG/ML
INJECTION, SOLUTION EPIDURAL; INFILTRATION; INTRACAUDAL; PERINEURAL AS NEEDED
Status: DISCONTINUED | OUTPATIENT
Start: 2022-12-07 | End: 2022-12-07 | Stop reason: HOSPADM

## 2022-12-07 NOTE — DISCHARGE INSTRUCTIONS
Epidural Steroid Injection   WHAT YOU NEED TO KNOW:   An epidural steroid injection (JEFF) is a procedure to inject steroid medicine into the epidural space  The epidural space is between your spinal cord and vertebrae  Steroids reduce inflammation and fluid buildup in your spine that may be causing pain  You may be given pain medicine along with the steroids  ACTIVITY  Do not drive or operate machinery today  No strenuous activity today - bending, lifting, etc   You may resume normal activites starting tomorrow - start slowly and as tolerated  You may shower today, but no tub baths or hot tubs  You may have numbness for several hours from the local anesthetic  Please use caution and common sense, especially with weight-bearing activities  CARE OF THE INJECTION SITE  If you have soreness or pain, apply ice to the area today (20 minutes on/20 minutes off)  Starting tomorrow, you may use warm, moist heat or ice if needed  You may have an increase or change in your discomfort for 36-48 hours after your treatment  Apply ice and continue with any pain medication you have been prescribed  Notify the [facility] if you have any of the following: redness, drainage, swelling, headache, stiff neck or fever above 100°F     SPECIAL INSTRUCTIONS  Our office will contact you in approximately 7 days for a progress report  MEDICATIONS  Continue to take all routine medications  Our office may have instructed you to hold some medications  As no general anesthesia was used in today's procedure, you should not experience any side effects related to anesthesia  If you are diabetic, the steroids used in today's injection may temporarily increase your blood sugar levels after the first few days after your injection  Please keep a close eye on your sugars and alert the doctor who manages your diabetes if your sugars are significantly high from your baseline or you are symptomatic       If you have a problem specifically related to your procedure, please call our office at [phone]  Problems not related to your procedure should be directed to your primary care physician

## 2022-12-07 NOTE — OP NOTE
OPERATIVE REPORT  PATIENT NAME: Bridgett Rebolledo    :  1926  MRN: [id number]  Pt Location: [facility] MINOR/PAIN ROOM 01    SURGERY DATE: 2022    Surgeon(s) and Role:     * Sera Merino DO - Primary    Preop Diagnosis:  Spinal stenosis of lumbar region with neurogenic claudication [M48 062]  Lumbar disc herniation [M51 26]  Lumbar radiculopathy [M54 16]    Post-Op Diagnosis Codes:     * Spinal stenosis of lumbar region with neurogenic claudication [M48 062]     * Lumbar disc herniation [M51 26]     * Lumbar radiculopathy [M54 16]    Procedure(s) (LRB):  BLOCK / INJECTION EPIDURAL STEROID TRANSFORAMINAL Right L5-S1 (Right)    Indication: Leg pain  Preoperative diagnosis: Lumbar radiculitis  Postoperative diagnosis: Lumbar radiculitis  Procedure: Fluoroscopically-guided right-sided L5-S1 and S1 transforaminal epidural steroid injection under fluoroscopy  EBL: none  Specimens: not applicable  After discussing the risks, benefits, and alternatives to the procedure, the patient expressed understanding and wished to proceed  The patient was brought to the fluoroscopy suite and placed in the prone position  A procedural pause was conducted to verify: correct patient identity, procedure to be performed and as applicable, correct side and site, correct patient position, and availability of implants, special equipment and special requirements  After identifying the right L5 pedicle fluoroscopically with an oblique view, the skin was sterilely prepped and draped in the usual fashion using Chloraprep skin prep  The skin and subcutaneous tissues were anesthetized with 1% lidocaine  A 3 5 in 22 gauge spinal needle was then advanced under fluoroscopic guidance to the neural foramen  Appropriate foraminal depth was determined with a lateral fluoroscopic view, and AP visualization confirmed needle positioning at approximately the 6 o'clock position relative to the pedicle   After negative aspiration, Omnipaque 300 contrast was injected using live fluoroscopy confirming appropriate transforaminal spread without evidence of intravascular or intrathecal uptake  Next, a 1 5 ml solution consisting of 5 mg of dexamethasone with 0 25% bupivacaine was injected slowly and incrementally into the epidural space  Following the injection the needle was withdrawn  The procedure was then repeated at the right S1 foramen  The skin and subcutaneous tissues were anesthetized with 1% lidocaine  A 3 5 in 22 gauge spinal needle was then advanced under fluoroscopic guidance to the foramen  Appropriate foraminal depth was determined with a lateral fluoroscopic view  AP visualization confirmed needle positioning  After negative aspiration Omnipaque 300 contrast was injected using live fluoroscopy confirming appropriate transforaminal spread without evidence of intravascular or intrathecal uptake  Next, a 1 5 mL solution consisting of 5 mg of dexamethasone with 0 25% bupivacaine was injected slowly and incrementally into the epidural space  Following the injection the needle was withdrawn  The patient tolerated the procedure well and there were no apparent complications  The patient did not develop any new neurologic deficits  After appropriate observation, the patient was dismissed from the clinic in good condition under their own power          SIGNATURE: Lucia Krishnamurthy,   DATE: December 7, 2022  TIME: 10:31 AM

## 2022-12-07 NOTE — INTERVAL H&P NOTE
H&P reviewed  After examining the patient I find no changes in the patients condition since the H&P had been written      Vitals:    12/07/22 1012   BP: 154/70   Pulse: 80   Resp: 16   Temp: (!) 96 5 °F (35 8 °C)   SpO2: 99%

## 2022-12-08 ENCOUNTER — TELEPHONE (OUTPATIENT)
Dept: PAIN MEDICINE | Facility: CLINIC | Age: 87
End: 2022-12-08

## 2022-12-08 NOTE — TELEPHONE ENCOUNTER
Caller: Liliana     Doctor: Norma Grossman    Reason for call: Tessie Red is requesting discharge summary for yesterday's procedure to be able to update her chart   Please fax this summary to fax # [phone], thx    Call back#: [phone]

## 2022-12-14 ENCOUNTER — TELEPHONE (OUTPATIENT)
Dept: PAIN MEDICINE | Facility: CLINIC | Age: 87
End: 2022-12-14

## 2022-12-14 NOTE — TELEPHONE ENCOUNTER
Pt daughter reports some improvement post inj  She has not complained of too much pain.    Patient is aware I will call back next week to get an update.

## 2022-12-28 ENCOUNTER — APPOINTMENT (OUTPATIENT)
Dept: RADIOLOGY | Facility: HOSPITAL | Age: 87
End: 2022-12-28

## 2022-12-28 ENCOUNTER — HOSPITAL ENCOUNTER (OUTPATIENT)
Facility: AMBULARY SURGERY CENTER | Age: 87
Setting detail: OUTPATIENT SURGERY
Discharge: HOME | End: 2022-12-28
Attending: PHYSICAL MEDICINE & REHABILITATION | Admitting: PHYSICAL MEDICINE & REHABILITATION

## 2022-12-28 VITALS
RESPIRATION RATE: 18 BRPM | OXYGEN SATURATION: 99 % | DIASTOLIC BLOOD PRESSURE: 77 MMHG | HEART RATE: 65 BPM | SYSTOLIC BLOOD PRESSURE: 194 MMHG | TEMPERATURE: 97.3 F

## 2022-12-28 DIAGNOSIS — R52 PAIN: ICD-10-CM

## 2022-12-28 RX ORDER — METHYLPREDNISOLONE ACETATE 80 MG/ML
INJECTION, SUSPENSION INTRA-ARTICULAR; INTRALESIONAL; INTRAMUSCULAR; SOFT TISSUE AS NEEDED
Status: DISCONTINUED | OUTPATIENT
Start: 2022-12-28 | End: 2022-12-28 | Stop reason: HOSPADM

## 2022-12-28 RX ORDER — BUPIVACAINE HYDROCHLORIDE 2.5 MG/ML
INJECTION, SOLUTION EPIDURAL; INFILTRATION; INTRACAUDAL AS NEEDED
Status: DISCONTINUED | OUTPATIENT
Start: 2022-12-28 | End: 2022-12-28 | Stop reason: HOSPADM

## 2022-12-28 RX ORDER — LIDOCAINE HYDROCHLORIDE 10 MG/ML
INJECTION, SOLUTION EPIDURAL; INFILTRATION; INTRACAUDAL; PERINEURAL AS NEEDED
Status: DISCONTINUED | OUTPATIENT
Start: 2022-12-28 | End: 2022-12-28 | Stop reason: HOSPADM

## 2022-12-28 NOTE — OP NOTE
OPERATIVE REPORT  PATIENT NAME: Terri Yuen    :  1926  MRN: [id number]  Pt Location: [facility] MINOR/PAIN ROOM 01    SURGERY DATE: 2022    Surgeon(s) and Role:     * Jeanie Cowden, DO - Primary    Preop Diagnosis:  Sacroiliitis ([address] ) [M46 1]    Post-Op Diagnosis Codes:     * Sacroiliitis ([address] ) [M46 1]    Procedure(s) (LRB):  BLOCK / INJECTION SACROILIAC Left SI joint injection (Left)      Indication: Low back/buttock pain  Preoperative Diagnosis: 1  Sacroiliac Joint Pain  2  Sacroiliitis  Postoperative Diagnosis: 1  Sacroiliac Joint Pain  2  Sacroiliitis  Procedure: Fluoroscopically-guided injection of the left sacroiliac joint(s)  EBL: none  Specimens: not applicable  After discussing the risks, benefits, and alternatives to the procedure, the patient expressed understanding and wished to proceed  The patient was brought to the fluoroscopy suite and placed in the prone position  Procedural pause conducted to verify: correct patient identity, procedure to be performed and as applicable, correct side and site, correct patient position, and availability of implants, special equipment and special requirements  Using fluoroscopy, the inferior portion of the sacroiliac joint was identified  The skin was sterilely prepped and draped in the usual fashion using Chloraprep skin prep  The skin and subcutaneous tissue were anesthetized with 1% buffered lidocaine  Using fluoroscopic guidance, a 3 5 inch 22-gauge spinal needle was advanced into joint  Proper needle positioning was confirmed using multiple fluoroscopic views  After negative aspiration, Omnipaque 240 contrast was injected, showing intraarticular spread of contrast without any evidence of intravascular uptake  A 3 mL solution consisting of 80 mg of Depo-Medrol in 0 25% bupivacaine was injected slowly and incrementally into the joint  Following the injection, the needle was withdrawn   The patient tolerated the procedure well and there were no apparent complications  After appropriate observation, the patient was dismissed from the clinic in good condition under their own power          SIGNATURE: Khanh Malagon DO  DATE: December 28, 2022  TIME: 11:38 AM

## 2022-12-28 NOTE — DISCHARGE INSTRUCTIONS
Steroid Joint Injection   WHAT YOU NEED TO KNOW:   A steroid joint injection is a procedure to inject steroid medicine into a joint  Steroid medicine decreases pain and inflammation  The injection may also contain an anesthetic (numbing medicine) to decrease pain  It may be done to treat conditions such as arthritis, gout, or carpal tunnel syndrome  The injections may be given in your knee, ankle, shoulder, elbow, wrist, ankle or sacroiliac joint  Do not apply heat to any area that is numb  If you have discomfort or soreness at the injection site, you may apply ice today, 20 minutes on and 20 minutes off  Tomorrow you may use ice or warm, moist heat  Do not apply ice or heat directly to the skin  You may have an increase or change in the discomfort for 36-48 hours after your treatment  Apply ice and continue with any pain medicine you have been prescribed  Do not do anything strenuous today  You may shower, but no tub baths or hot tubs today  You may resume your normal activities tomorrow, but do not “overdo it”  Resume normal activities slowly when you are feeling better  If you experience redness, drainage or swelling at the injection site, or if you develop a fever above 100 degrees, please call [facility] at [phone] or go to the Emergency Room  Continue to take all routine medicines prescribed by your primary care physician unless otherwise instructed by our staff  Most blood thinners should be started again according to your regularly scheduled dosing  If you have any questions, please give our office a call  As no general anesthesia was used in today's procedure, you should not experience any side effects related to anesthesia  If you are diabetic, the steroids used in today's injection may temporarily increase your blood sugar levels after the first few days after your injection   Please keep a close eye on your sugars and alert the doctor who manages your diabetes if your sugars are significantly high from your baseline or you are symptomatic  If you have a problem specifically related to your procedure, please call our office at [phone]  Problems not related to your procedure should be directed to your primary care physician

## 2022-12-28 NOTE — H&P
History of Present Illness: The patient is a 80 y o  female who presents with complaints of left buttock pain    Past Medical History:   Diagnosis Date   • Hypertension        Past Surgical History:   Procedure Laterality Date   • EPIDURAL BLOCK INJECTION Left 07/14/2022    Procedure: L5 S1 LUMBAR EPIDURAL STEROID INJECTION (99828); Surgeon: Juventino Staton MD;  Location: [facility] MAIN OR;  Service: Pain Management    • EPIDURAL BLOCK INJECTION Right 10/21/2022    Procedure: BLOCK / INJECTION EPIDURAL STEROID TRANSFORAMINAL  Right L5/S1 TFESI;  Surgeon: Juventino Staton MD;  Location: [facility] MAIN OR;  Service: Pain Management    • EPIDURAL BLOCK INJECTION Right 12/7/2022    Procedure: BLOCK / INJECTION EPIDURAL STEROID TRANSFORAMINAL Right L5-S1;  Surgeon: Rishabh Tran DO;  Location: [facility] MAIN OR;  Service: Pain Management    • WISDOM TOOTH EXTRACTION Bilateral        No current facility-administered medications for this encounter  Allergies   Allergen Reactions   • Aspirin Rash   • Codeine Rash   • Penicillins Rash   • Sulfa Antibiotics Rash       Physical Exam:   Vitals:    12/28/22 1052   BP: 160/76   Pulse: 87   Resp: 18   Temp: (!) 97 3 °F (36 3 °C)   SpO2: 99%     General: Awake, Alert, Oriented x 3, Mood and affect appropriate  Respiratory: Respirations even and unlabored  Cardiovascular: Peripheral pulses intact; no edema  Musculoskeletal Exam: Tenderness palpation left glutes    ASA Score: 2    Patient/Chart Verification  Patient ID Verified: Verbal, Armband  ID Band Applied: Yes  Consents Confirmed: Procedural  H&P( within 30 days) Verified: Yes  Interval H&P(within 24 hr) Complete (required for Outpatients and Surgery Admit only): Yes  Beta Blocker given : N/A  Pre-op Lab/Test Results Available: N/A  Pregnancy Lab Collected: N/A comment  Does Patient Have a Prosthetic Device/Implant: Yes    Assessment:   1   Pain        Plan:

## 2023-01-04 ENCOUNTER — TELEPHONE (OUTPATIENT)
Dept: OBGYN CLINIC | Facility: HOSPITAL | Age: 88
End: 2023-01-04

## 2023-01-04 NOTE — TELEPHONE ENCOUNTER
Pt's caregiver states that patient is doing much better since the injection    Patient's pain has minimized      Patient's caregiver is aware I will call back next week to get an update

## 2023-01-31 ENCOUNTER — OFFICE VISIT (OUTPATIENT)
Dept: PAIN MEDICINE | Facility: CLINIC | Age: 88
End: 2023-01-31

## 2023-01-31 VITALS
HEIGHT: 58 IN | BODY MASS INDEX: 22.67 KG/M2 | DIASTOLIC BLOOD PRESSURE: 75 MMHG | WEIGHT: 108 LBS | HEART RATE: 78 BPM | TEMPERATURE: 98.2 F | SYSTOLIC BLOOD PRESSURE: 125 MMHG

## 2023-01-31 DIAGNOSIS — G89.4 CHRONIC PAIN SYNDROME: Primary | ICD-10-CM

## 2023-01-31 DIAGNOSIS — G62.9 NEUROPATHY: ICD-10-CM

## 2023-01-31 DIAGNOSIS — N18.32 STAGE 3B CHRONIC KIDNEY DISEASE (HCC): ICD-10-CM

## 2023-01-31 DIAGNOSIS — M54.16 LUMBAR RADICULOPATHY: ICD-10-CM

## 2023-01-31 RX ORDER — GABAPENTIN 100 MG/1
100 CAPSULE ORAL
Qty: 30 CAPSULE | Refills: 1 | Status: SHIPPED | OUTPATIENT
Start: 2023-01-31

## 2023-01-31 RX ORDER — BUMETANIDE 0.5 MG/1
0.5 TABLET ORAL DAILY
COMMUNITY

## 2023-01-31 NOTE — PATIENT INSTRUCTIONS
Gabapentin (By mouth)   Gabapentin (duong-a-PEN-tin)  Treats seizures and pain caused by shingles  Brand Name(s): FusePaq Fanatrex, Neurontin   There may be other brand names for this medicine  When This Medicine Should Not Be Used: This medicine is not right for everyone  Do not use it if you had an allergic reaction to gabapentin  How to Use This Medicine:   Capsule, Liquid, Tablet  Take your medicine as directed  Your dose may need to be changed several times to find what works best for you  If you have epilepsy, do not allow more than 12 hours to pass between doses  Capsule: Swallow the capsule whole with plenty of water  Do not open, crush, or chew it  Gralise® tablet: Swallow the tablet whole   Do not crush, break, or chew it  Neurontin® tablet: If you break a tablet into 2 pieces, use the second half as your next dose  Do not use the half-tablet if the whole tablet has been cut or broken after 28 days  Oral liquid: Measure the oral liquid medicine with a marked measuring spoon, oral syringe, or medicine cup  This medicine should come with a Medication Guide  Ask your pharmacist for a copy if you do not have one  Missed dose: Take a dose as soon as you remember  If it is almost time for your next dose, wait until then and take a regular dose  Do not take extra medicine to make up for a missed dose  Store the medicine in a closed container at room temperature, away from heat, moisture, and direct light  Store the Neurontin® oral liquid in the refrigerator  Do not freeze  Drugs and Foods to Avoid:   Ask your doctor or pharmacist before using any other medicine, including over-the-counter medicines, vitamins, and herbal products  Some medicines can affect how gabapentin works  Tell your doctor if you also using hydrocodone or morphine  If you take an antacid, wait at least 2 hours before you take gabapentin  Do not drink alcohol while you are using this medicine    Tell your doctor if you use anything else that makes you sleepy  Some examples are allergy medicine, narcotic pain medicine, and alcohol  Tell your doctor if you are also using lorazepam, oxycodone, or zolpidem  Warnings While Using This Medicine:   Tell your doctor if you are pregnant or breastfeeding, or if you have kidney problems (including patients receiving dialysis) or lung problems  Tell your doctor if you have a history of depression or mental health problems  This medicine may cause the following problems:  Drug reaction with eosinophilia and systemic symptoms (DRESS) or multiorgan hypersensitivity, which may damage the liver, kidney, blood, heart, or muscles  Changes in mood or behavior, including suicidal thoughts or behavior  Respiratory depression (serious breathing problem that can be life-threatening), when used with narcotic pain medicines  Do not stop using this medicine suddenly  Your doctor will need to slowly decrease your dose before you stop it completely  This medicine may make you dizzy or drowsy  Do not drive or do anything else that could be dangerous until you know how this medicine affects you  Tell any doctor or dentist who treats you that you are using this medicine  This medicine may affect certain medical test results  Your doctor will check your progress and the effects of this medicine at regular visits  Keep all appointments  Keep all medicine out of the reach of children  Never share your medicine with anyone  Possible Side Effects While Using This Medicine:   Call your doctor right away if you notice any of these side effects:   Allergic reaction: Itching or hives, swelling in your face or hands, swelling or tingling in your mouth or throat, chest tightness, trouble breathing  Behavior problems, aggression, restlessness, trouble concentrating, moodiness (especially in children)  Blistering, peeling, red skin rash  Blue lips, fingernails, or skin, chest pain, fast heartbeat, trouble breathing  Change in how much or how often you urinate, bloody or cloudy urine  Dark urine or pale stools, nausea, vomiting, loss of appetite, stomach pain, yellow skin or eyes  Fever, chills, cough, sore throat, body aches  Problems with coordination, shakiness, unsteadiness, unusual eye movement  Rapid weight gain, swelling in your hands, ankles, or feet  Rash, swollen or tender glands in the neck, armpit, or groin  Unusual moods or behaviors, thoughts of hurting yourself, feeling depressed  If you notice these less serious side effects, talk with your doctor:   Dizziness, drowsiness, sleepiness, tiredness  If you notice other side effects that you think are caused by this medicine, tell your doctor  Call your doctor for medical advice about side effects  You may report side effects to FDA at 1-498-FDA-0026    © Copyright [employer] 2022 Information is for End User's use only and may not be sold, redistributed or otherwise used for commercial purposes  The above information is an  only  It is not intended as medical advice for individual conditions or treatments  Talk to your doctor, nurse or pharmacist before following any medical regimen to see if it is safe and effective for you

## 2023-01-31 NOTE — PROGRESS NOTES
Pain Medicine Follow-Up Note    Assessment:  1  Chronic pain syndrome    2  Lumbar radiculopathy    3  Neuropathy    4  Stage 3b chronic kidney disease ([address] )        Plan:      New Medications Ordered This Visit   Medications   • bumetanide (BUMEX) 0 5 MG tablet     Sig: Take 0 5 mg by mouth daily   • gabapentin (NEURONTIN) 100 mg capsule     Sig: Take 1 capsule (100 mg total) by mouth daily at bedtime     Dispense:  30 capsule     Refill:  1       My impressions and treatment recommendations were discussed in detail with the patient who verbalized understanding and had no further questions  Patient presents the office stating that her pain symptoms are the same patient follows up in the office regarding a right L5-S1 transforaminal epidural steroid injection on 12/7/2022 as well as a left sacroiliac joint injection that she received on 12/28/2022  Unfortunately the patient states that neither of these injections were effective for her pain  Patient primarily concerned about bilateral lower leg cramping right worse than the left  At this time I recommend the patient trial gabapentin 100 mg capsule patient to take 1 capsule at bedtime  Patient and patient's daughter educated on gabapentin including the side effects and how to take the medication as well as not to stop the medication suddenly after taking consistently due to potential withdrawal effects  Patient and patient's daughter verbalized understanding  Follow-up is planned in 8 weeks time or sooner as warranted  Discharge instructions were provided  I personally saw and examined the patient and I agree with the above discussed plan of care  History of Present Illness:    Lucy Figueredo is a 80 y o  female who presents to [facility] for interval re-evaluation of the above stated pain complaints  The patient has a past medical and chronic pain history as outlined in the assessment section   She was last seen on 12/28/2022  At today's visit patient states that her pain symptoms are the same with a pain score of 8 out of 10 on the verbal numeric pain scale  The patient's pain is worse in the evening  The patient's pain is intermittent in nature  The quality of the patient's pain is described as burning, throbbing, cramping, shooting, and pins-and-needles  Patient indicates that her pain is located on her bilateral lower extremities  Other than as stated above, the patient denies any interval changes in medications, medical condition, mental condition, symptoms, or allergies since the last office visit  Review of Systems:    Review of Systems   Respiratory: Positive for shortness of breath  Cardiovascular: Positive for leg swelling  Negative for chest pain  Gastrointestinal: Negative for constipation, diarrhea, nausea and vomiting  Musculoskeletal: Positive for gait problem  Negative for arthralgias, joint swelling and myalgias  Skin: Negative for rash  Neurological: Positive for weakness  Negative for dizziness and seizures  Memory loss   All other systems reviewed and are negative  Past Medical History:   Diagnosis Date   • Hypertension        Past Surgical History:   Procedure Laterality Date   • EPIDURAL BLOCK INJECTION Left 07/14/2022    Procedure: L5 S1 LUMBAR EPIDURAL STEROID INJECTION (68987);   Surgeon: Erik Mack MD;  Location: [facility] MAIN OR;  Service: Pain Management    • EPIDURAL BLOCK INJECTION Right 10/21/2022    Procedure: BLOCK / INJECTION EPIDURAL STEROID TRANSFORAMINAL  Right L5/S1 TFESI;  Surgeon: Erik Mack MD;  Location: [facility] MAIN OR;  Service: Pain Management    • EPIDURAL BLOCK INJECTION Right 12/7/2022    Procedure: BLOCK / INJECTION EPIDURAL STEROID TRANSFORAMINAL Right L5-S1;  Surgeon: Naheed Knutson DO;  Location: [facility] MAIN OR;  Service: Pain Management    • NM INJECT SI JOINT ARTHRGRPHY&/ANES/STEROID W/CIARRA Left 12/28/2022    Procedure: BLOCK / INJECTION SACROILIAC Left SI joint injection;  Surgeon: Honorio Scherer DO;  Location: [facility] MAIN OR;  Service: Pain Management    • WISDOM TOOTH EXTRACTION Bilateral        Family History   Problem Relation Age of Onset   • No Known Problems Mother    • No Known Problems Father        Social History     Occupational History   • Not on file   Tobacco Use   • Smoking status: Never   • Smokeless tobacco: Never   Vaping Use   • Vaping Use: Never used   Substance and Sexual Activity   • Alcohol use: Not Currently     Alcohol/week: 1 0 standard drink     Types: 1 Glasses of wine per week   • Drug use: Never   • Sexual activity: Not Currently         Current Outpatient Medications:   •  amiodarone 100 mg tablet, Take 100 mg by mouth daily, Disp: , Rfl:   •  bumetanide (BUMEX) 0 5 MG tablet, Take 0 5 mg by mouth daily, Disp: , Rfl:   •  cholecalciferol (VITAMIN D3) 1,000 units tablet, Take 1,000 Units by mouth daily, Disp: , Rfl:   •  clopidogrel (PLAVIX) 75 mg tablet, Take 75 mg by mouth daily, Disp: , Rfl:   •  cyanocobalamin (VITAMIN B-12) 100 mcg tablet, Take by mouth daily, Disp: , Rfl:   •  gabapentin (NEURONTIN) 100 mg capsule, Take 1 capsule (100 mg total) by mouth daily at bedtime, Disp: 30 capsule, Rfl: 1  •  vitamin E, tocopherol, 400 units capsule, Take 400 Units by mouth daily, Disp: , Rfl:   •  furosemide (LASIX) 40 mg tablet, Take 40 mg by mouth 2 (two) times a day (Patient not taking: Reported on 1/31/2023), Disp: , Rfl:     Allergies   Allergen Reactions   • Aspirin Rash   • Codeine Rash   • Penicillins Rash   • Sulfa Antibiotics Rash       Physical Exam:    /75   Pulse 78   Temp 98 2 °F (36 8 °C)   Ht 4' 10" (1 473 m)   Wt 49 kg (108 lb)   BMI 22 57 kg/m²     Constitutional:normal, well developed, well nourished, alert, in no distress and non-toxic and no overt pain behavior    Eyes:anicteric  HEENT:grossly intact  Neck:supple, symmetric, trachea midline and no masses Pulmonary:even and unlabored  Cardiovascular:No edema or pitting edema present  Skin:Normal without rashes or lesions and well hydrated  Psychiatric:Mood and affect appropriate  Neurologic:Cranial Nerves II-XII grossly intact  Musculoskeletal:antalgic, shuffling and Ambulates with a Rollator      This document was created using speech voice recognition software  Grammatical errors, random word insertions, pronoun errors, and incomplete sentences are an occasional consequence of this system due to software limitations, ambient noise, and hardware issues  Any formal questions or concerns about content, text, or information contained within the body of this dictation should be directly addressed to the provider for clarification

## 2023-02-16 ENCOUNTER — TELEPHONE (OUTPATIENT)
Dept: PAIN MEDICINE | Facility: CLINIC | Age: 88
End: 2023-02-16

## 2023-02-16 DIAGNOSIS — M54.16 LUMBAR RADICULOPATHY: Primary | ICD-10-CM

## 2023-02-16 RX ORDER — PREGABALIN 25 MG/1
25 CAPSULE ORAL
Qty: 30 CAPSULE | Refills: 0 | Status: SHIPPED | OUTPATIENT
Start: 2023-02-16 | End: 2023-07-31

## 2023-02-16 NOTE — TELEPHONE ENCOUNTER
Caller: Eunice ( YOVANA)    Doctor: Reinaldo POWER     Reason for call: Gabapentin 100 mg does not agree with the pt  Is there something else that she can take to help      Call back#: [phone]

## 2023-02-16 NOTE — TELEPHONE ENCOUNTER
She can try Lyrica which is a second generation drug of gabapentin  I sent over pregabalin 25 mg capsule to the patient's pharmacy she is to take it once daily preferably before bedtime  The side effects are similar to gabapentin most common is drowsiness and dizziness

## 2023-05-19 ENCOUNTER — TELEPHONE (OUTPATIENT)
Dept: PAIN MEDICINE | Facility: CLINIC | Age: 88
End: 2023-05-19

## 2023-05-19 NOTE — TELEPHONE ENCOUNTER
I can order a repeat SI joint injection however the insurance may require that she come in for an evaluation  I will order it now and see if the insurance requires an evaluation before getting this injection      Okay to order a left sacroiliac joint injection with Dr Belle Piedra

## 2023-05-19 NOTE — TELEPHONE ENCOUNTER
Caller: Aileen Zamoraew (pt's grandson)    Doctor: Kim Cannon    Reason for call: pt is requesting a repeat injection for her left leg pain    Call back#: [phone]

## 2023-05-19 NOTE — TELEPHONE ENCOUNTER
RN s/w pt's grandson and POA listed on emergency contact list   Per Dieter Alan it is usually his step mother who does things for his grandmother (the pt) but she is out of the country for the next 6 weeks  Pt last had Rt L5-S1 TFESI on 12/7/22 and Left SIJ on 12/28/22    Per Dieter Alan the pt now resides in assisted living, c/o left low back/leg pain same as previous  Per Dieter Alan she has 6/10 pain level  Had 75% relief from injection   Lasted 5 months  Per grandson the pt stated it felt like the pain came back suddenly over night more leg pain then back pain  Please advise thank you  Repeat the left SIJ?

## 2023-05-22 NOTE — TELEPHONE ENCOUNTER
After speaking with Dr Jus Luo  She should be seen before repeating the injection    Apt made for 5/30/23 @ 2pm with [facility]

## 2023-05-30 ENCOUNTER — TELEPHONE (OUTPATIENT)
Dept: PAIN MEDICINE | Facility: CLINIC | Age: 88
End: 2023-05-30

## 2023-05-30 ENCOUNTER — OFFICE VISIT (OUTPATIENT)
Dept: PAIN MEDICINE | Facility: CLINIC | Age: 88
End: 2023-05-30

## 2023-05-30 VITALS — SYSTOLIC BLOOD PRESSURE: 138 MMHG | HEART RATE: 68 BPM | TEMPERATURE: 98.5 F | DIASTOLIC BLOOD PRESSURE: 70 MMHG

## 2023-05-30 DIAGNOSIS — M46.1 SACROILIITIS (HCC): ICD-10-CM

## 2023-05-30 DIAGNOSIS — M54.16 LUMBAR RADICULOPATHY: Primary | ICD-10-CM

## 2023-05-30 DIAGNOSIS — G89.4 CHRONIC PAIN SYNDROME: ICD-10-CM

## 2023-05-30 NOTE — TELEPHONE ENCOUNTER
[facility] , pt was given our office number to call and reschedule please help when she calls   Thank you

## 2023-05-30 NOTE — H&P (VIEW-ONLY)
Pain Medicine Follow-Up Note    Assessment:  1  Lumbar radiculopathy    2  Sacroiliitis ([address] )    3  Chronic pain syndrome        Plan:  Ms Ian Grubbs is a pleasant 60-year-old female who presents for follow-up and reevaluation regarding low back pain with radiating symptoms into bilateral lower extremities  Previously performed a right-sided L5-S1 TFESI which provided nearly 100% relief in her pain from December until late April and reports the pain has gradually returned into bilateral lower extremities  At this time given significant relief from previous injection we will now plan for bilateral L5-S1 TFESI under fluoroscopy guidance  All questions answered, patient is agreeable with plan  Complete risks and benefits including bleeding, infection, tissue reaction, nerve injury and allergic reaction were discussed  The approach was demonstrated using models and literature was provided  Verbal and written consent was obtained  History of Present Illness:    Amari Sandoval is a 80 y o  female who presents to [facility] for interval re-evaluation of the above stated pain complaints  The patient has a past medical and chronic pain history as outlined in the assessment section  Patient presents for follow-up and reevaluation regarding ongoing low back pain with radiating symptoms into the right lower extremity with worsening radicular symptoms into the left lower extremity  We previously performed right-sided transforaminal epidural steroid injection which provided nearly 100% relief in her pain for several months from December till late April and reports the pain is gradually returned to previous level  Currently reporting 9 out of 10 pain that is described as a constant throbbing, shooting, aching sensation into bilateral lower extremities  Presents today for follow-up and reevaluation      Other than as stated above, the patient denies any interval changes in medications, medical condition, mental condition, symptoms, or allergies since the last office visit  Review of Systems:    Review of Systems   Constitutional: Negative for chills, fatigue and unexpected weight change  HENT: Negative for ear pain, mouth sores and sinus pressure  Eyes: Negative for pain, redness and visual disturbance  Respiratory: Negative for shortness of breath and wheezing  Cardiovascular: Negative for chest pain and palpitations  Gastrointestinal: Negative for abdominal pain and nausea  Endocrine: Negative for polyphagia  Musculoskeletal: Positive for back pain, gait problem and joint swelling  Negative for arthralgias and neck pain  Skin: Negative for wound  Neurological: Positive for weakness and numbness  Negative for seizures  Psychiatric/Behavioral: Positive for sleep disturbance  Negative for decreased concentration and dysphoric mood  Past Medical History:   Diagnosis Date   • Hypertension        Past Surgical History:   Procedure Laterality Date   • EPIDURAL BLOCK INJECTION Left 07/14/2022    Procedure: L5 S1 LUMBAR EPIDURAL STEROID INJECTION (25970);   Surgeon: Shelby Armstrong MD;  Location: [facility] MAIN OR;  Service: Pain Management    • EPIDURAL BLOCK INJECTION Right 10/21/2022    Procedure: BLOCK / INJECTION EPIDURAL STEROID TRANSFORAMINAL  Right L5/S1 TFESI;  Surgeon: Shelby Armstrong MD;  Location: [facility] MAIN OR;  Service: Pain Management    • EPIDURAL BLOCK INJECTION Right 12/7/2022    Procedure: BLOCK / INJECTION EPIDURAL STEROID TRANSFORAMINAL Right L5-S1;  Surgeon: Sue Mcfadden DO;  Location: [facility] MAIN OR;  Service: Pain Management    • [place] INJECT SI JOINT ARTHRGRPHY&/ANES/STEROID W/CIARRA Left 12/28/2022    Procedure: BLOCK / INJECTION SACROILIAC Left SI joint injection;  Surgeon: Sue Mcfadden DO;  Location: [facility] MAIN OR;  Service: Pain Management    • WISDOM TOOTH EXTRACTION Bilateral        Family History   Problem Relation Age of Onset   • No Known Problems Mother    • No Known Problems Father        Social History     Occupational History   • Not on file   Tobacco Use   • Smoking status: Never   • Smokeless tobacco: Never   Vaping Use   • Vaping Use: Never used   Substance and Sexual Activity   • Alcohol use: Not Currently     Alcohol/week: 1 0 standard drink of alcohol     Types: 1 Glasses of wine per week   • Drug use: Never   • Sexual activity: Not Currently         Current Outpatient Medications:   •  amiodarone 100 mg tablet, Take 100 mg by mouth daily, Disp: , Rfl:   •  bumetanide (BUMEX) 0 5 MG tablet, Take 0 5 mg by mouth daily, Disp: , Rfl:   •  cholecalciferol (VITAMIN D3) 1,000 units tablet, Take 1,000 Units by mouth daily, Disp: , Rfl:   •  clopidogrel (PLAVIX) 75 mg tablet, Take 75 mg by mouth daily, Disp: , Rfl:   •  cyanocobalamin (VITAMIN B-12) 100 mcg tablet, Take by mouth daily, Disp: , Rfl:   •  furosemide (LASIX) 40 mg tablet, Take 40 mg by mouth 2 (two) times a day, Disp: , Rfl:   •  vitamin E, tocopherol, 400 units capsule, Take 400 Units by mouth daily, Disp: , Rfl:   •  pregabalin (LYRICA) 25 mg capsule, Take 1 capsule (25 mg total) by mouth daily at bedtime, Disp: 30 capsule, Rfl: 0    Allergies   Allergen Reactions   • Aspirin Rash   • Codeine Rash   • Penicillins Rash   • Sulfa Antibiotics Rash       Physical Exam:    /70   Pulse 68   Temp 98 5 °F (36 9 °C)     Constitutional:normal, well developed, well nourished, alert, in no distress and non-toxic and no overt pain behavior    Eyes:anicteric  HEENT:grossly intact  Neck:supple, symmetric, trachea midline and no masses   Pulmonary:even and unlabored  Cardiovascular:No edema or pitting edema present  Skin:Normal without rashes or lesions and well hydrated  Psychiatric:Mood and affect appropriate  Neurologic:Cranial Nerves II-XII grossly intact  Musculoskeletal:antalgic and in rolling walker      Imaging  No orders to display         No orders of the defined types were placed in this encounter

## 2023-05-30 NOTE — PROGRESS NOTES
Pain Medicine Follow-Up Note    Assessment:  1  Lumbar radiculopathy    2  Sacroiliitis ([address] )    3  Chronic pain syndrome        Plan:  Ms Jennifer Gutiérrez is a pleasant 55-year-old female who presents for follow-up and reevaluation regarding low back pain with radiating symptoms into bilateral lower extremities  Previously performed a right-sided L5-S1 TFESI which provided nearly 100% relief in her pain from December until late April and reports the pain has gradually returned into bilateral lower extremities  At this time given significant relief from previous injection we will now plan for bilateral L5-S1 TFESI under fluoroscopy guidance  All questions answered, patient is agreeable with plan  Complete risks and benefits including bleeding, infection, tissue reaction, nerve injury and allergic reaction were discussed  The approach was demonstrated using models and literature was provided  Verbal and written consent was obtained  History of Present Illness:    Amelia Dominguez is a 80 y o  female who presents to [facility] for interval re-evaluation of the above stated pain complaints  The patient has a past medical and chronic pain history as outlined in the assessment section  Patient presents for follow-up and reevaluation regarding ongoing low back pain with radiating symptoms into the right lower extremity with worsening radicular symptoms into the left lower extremity  We previously performed right-sided transforaminal epidural steroid injection which provided nearly 100% relief in her pain for several months from December till late April and reports the pain is gradually returned to previous level  Currently reporting 9 out of 10 pain that is described as a constant throbbing, shooting, aching sensation into bilateral lower extremities  Presents today for follow-up and reevaluation      Other than as stated above, the patient denies any interval changes in medications, medical condition, mental condition, symptoms, or allergies since the last office visit  Review of Systems:    Review of Systems   Constitutional: Negative for chills, fatigue and unexpected weight change  HENT: Negative for ear pain, mouth sores and sinus pressure  Eyes: Negative for pain, redness and visual disturbance  Respiratory: Negative for shortness of breath and wheezing  Cardiovascular: Negative for chest pain and palpitations  Gastrointestinal: Negative for abdominal pain and nausea  Endocrine: Negative for polyphagia  Musculoskeletal: Positive for back pain, gait problem and joint swelling  Negative for arthralgias and neck pain  Skin: Negative for wound  Neurological: Positive for weakness and numbness  Negative for seizures  Psychiatric/Behavioral: Positive for sleep disturbance  Negative for decreased concentration and dysphoric mood  Past Medical History:   Diagnosis Date   • Hypertension        Past Surgical History:   Procedure Laterality Date   • EPIDURAL BLOCK INJECTION Left 07/14/2022    Procedure: L5 S1 LUMBAR EPIDURAL STEROID INJECTION (53091);   Surgeon: Rodolfo Shay MD;  Location: [facility] MAIN OR;  Service: Pain Management    • EPIDURAL BLOCK INJECTION Right 10/21/2022    Procedure: BLOCK / INJECTION EPIDURAL STEROID TRANSFORAMINAL  Right L5/S1 TFESI;  Surgeon: Rodolfo Shay MD;  Location: [facility] MAIN OR;  Service: Pain Management    • EPIDURAL BLOCK INJECTION Right 12/7/2022    Procedure: BLOCK / INJECTION EPIDURAL STEROID TRANSFORAMINAL Right L5-S1;  Surgeon: Hosea Byrd DO;  Location: [facility] MAIN OR;  Service: Pain Management    • [place] INJECT SI JOINT ARTHRGRPHY&/ANES/STEROID W/CIARRA Left 12/28/2022    Procedure: BLOCK / INJECTION SACROILIAC Left SI joint injection;  Surgeon: Hosea Byrd DO;  Location: [facility] MAIN OR;  Service: Pain Management    • WISDOM TOOTH EXTRACTION Bilateral        Family History   Problem Relation Age of Onset   • No Known Problems Mother    • No Known Problems Father        Social History     Occupational History   • Not on file   Tobacco Use   • Smoking status: Never   • Smokeless tobacco: Never   Vaping Use   • Vaping Use: Never used   Substance and Sexual Activity   • Alcohol use: Not Currently     Alcohol/week: 1 0 standard drink of alcohol     Types: 1 Glasses of wine per week   • Drug use: Never   • Sexual activity: Not Currently         Current Outpatient Medications:   •  amiodarone 100 mg tablet, Take 100 mg by mouth daily, Disp: , Rfl:   •  bumetanide (BUMEX) 0 5 MG tablet, Take 0 5 mg by mouth daily, Disp: , Rfl:   •  cholecalciferol (VITAMIN D3) 1,000 units tablet, Take 1,000 Units by mouth daily, Disp: , Rfl:   •  clopidogrel (PLAVIX) 75 mg tablet, Take 75 mg by mouth daily, Disp: , Rfl:   •  cyanocobalamin (VITAMIN B-12) 100 mcg tablet, Take by mouth daily, Disp: , Rfl:   •  furosemide (LASIX) 40 mg tablet, Take 40 mg by mouth 2 (two) times a day, Disp: , Rfl:   •  vitamin E, tocopherol, 400 units capsule, Take 400 Units by mouth daily, Disp: , Rfl:   •  pregabalin (LYRICA) 25 mg capsule, Take 1 capsule (25 mg total) by mouth daily at bedtime, Disp: 30 capsule, Rfl: 0    Allergies   Allergen Reactions   • Aspirin Rash   • Codeine Rash   • Penicillins Rash   • Sulfa Antibiotics Rash       Physical Exam:    /70   Pulse 68   Temp 98 5 °F (36 9 °C)     Constitutional:normal, well developed, well nourished, alert, in no distress and non-toxic and no overt pain behavior    Eyes:anicteric  HEENT:grossly intact  Neck:supple, symmetric, trachea midline and no masses   Pulmonary:even and unlabored  Cardiovascular:No edema or pitting edema present  Skin:Normal without rashes or lesions and well hydrated  Psychiatric:Mood and affect appropriate  Neurologic:Cranial Nerves II-XII grossly intact  Musculoskeletal:antalgic and in rolling walker      Imaging  No orders to display         No orders of the defined types were placed in this encounter

## 2023-05-30 NOTE — PROGRESS NOTES
Assessment:  No diagnosis found  Plan:  The patient had a {successful/unsuccesful:04969} {Spine Cord Stim Trial:26772} spinal cord stimulator trial     The patient reports overall *** % improvement in pain  The patient reports overall decrease in the usage of pain medications  The patient reports overall improvement in functional ability during the period of this trial     The patient will be referred to {Referral Provider:44351} for permanent implantation  The patient was advised to complete their antibiotics course and ***    No orders of the defined types were placed in this encounter  History of Present Illness:  Nat Enriquez is a 80 y o  female status post {Spine Cord Stim [facility]:39902} spinal cord stimulator trial on ***   The patient reports ***    Review of Systems   Constitutional: Negative for unexpected weight change  HENT: Negative for ear pain  Eyes: Negative for visual disturbance  Respiratory: Negative for shortness of breath and wheezing  Gastrointestinal: Negative for abdominal pain  Musculoskeletal: Positive for back pain, gait problem and joint swelling  Pain in lower legs and swelling, Decreased ROM   Neurological: Positive for weakness  Negative for numbness  Psychiatric/Behavioral: Positive for sleep disturbance  Negative for decreased concentration  Patient Active Problem List   Diagnosis   • Stage 3b chronic kidney disease ([facility] [address] )   • Lumbar radiculopathy   • Sacroiliitis [facility])        Past Medical History:   Diagnosis Date   • Hypertension        Past Surgical History:   Procedure Laterality Date   • EPIDURAL BLOCK INJECTION Left 07/14/2022    Procedure: L5 S1 LUMBAR EPIDURAL STEROID INJECTION (66352);   Surgeon: Sarah Carrillo MD;  Location: [facility] MAIN OR;  Service: Pain Management    • EPIDURAL BLOCK INJECTION Right 10/21/2022    Procedure: BLOCK / INJECTION EPIDURAL STEROID TRANSFORAMINAL  Right L5/S1 TFESI;  Surgeon: Sarah Carrillo MD;  Location: [facility] [place] [address] MAIN OR;  Service: Pain Management    • EPIDURAL BLOCK INJECTION Right 12/7/2022    Procedure: BLOCK / INJECTION EPIDURAL STEROID TRANSFORAMINAL Right L5-S1;  Surgeon: Uche Franklin DO;  Location: [place] [address] MAIN OR;  Service: Pain Management    • [place] INJECT SI JOINT ARTHRGRPHY&/ANES/STEROID W/CIARRA Left 12/28/2022    Procedure: BLOCK / INJECTION SACROILIAC Left SI joint injection;  Surgeon: Uche Franklin DO;  Location: [place] [address] MAIN OR;  Service: Pain Management    • WISDOM TOOTH EXTRACTION Bilateral        Family History   Problem Relation Age of Onset   • No Known Problems Mother    • No Known Problems Father        Social History     Occupational History   • Not on file   Tobacco Use   • Smoking status: Never   • Smokeless tobacco: Never   Vaping Use   • Vaping Use: Never used   Substance and Sexual Activity   • Alcohol use: Not Currently     Alcohol/week: 1 0 standard drink of alcohol     Types: 1 Glasses of wine per week   • Drug use: Never   • Sexual activity: Not Currently         Current Outpatient Medications:   •  amiodarone 100 mg tablet, Take 100 mg by mouth daily, Disp: , Rfl:   •  bumetanide (BUMEX) 0 5 MG tablet, Take 0 5 mg by mouth daily, Disp: , Rfl:   •  cholecalciferol (VITAMIN D3) 1,000 units tablet, Take 1,000 Units by mouth daily, Disp: , Rfl:   •  clopidogrel (PLAVIX) 75 mg tablet, Take 75 mg by mouth daily, Disp: , Rfl:   •  cyanocobalamin (VITAMIN B-12) 100 mcg tablet, Take by mouth daily, Disp: , Rfl:   •  furosemide (LASIX) 40 mg tablet, Take 40 mg by mouth 2 (two) times a day, Disp: , Rfl:   •  vitamin E, tocopherol, 400 units capsule, Take 400 Units by mouth daily, Disp: , Rfl:   •  pregabalin (LYRICA) 25 mg capsule, Take 1 capsule (25 mg total) by mouth daily at bedtime, Disp: 30 capsule, Rfl: 0    Allergies   Allergen Reactions   • Aspirin Rash   • Codeine Rash   • Penicillins Rash   • Sulfa Antibiotics Rash         Physical Exam:    /70   Pulse 68   Temp 98 5 °F (36 9 °C)     Thoracic Spine:  Spinal cord stimulator lead removed with tip intact; no erythema, tenderness or signs of infection; area cleansed with alcohol and bandage placed

## 2023-05-31 ENCOUNTER — TELEPHONE (OUTPATIENT)
Dept: PAIN MEDICINE | Facility: CLINIC | Age: 88
End: 2023-05-31

## 2023-05-31 NOTE — TELEPHONE ENCOUNTER
Scheduled patient for TFESI 6/21/23  Patient is taking Plavix  Nothing to eat or drink 1 hour prior to procedure  Needs to arrange transportation  Proper clothing for procedure  No vaccines 2 weeks prior or after procedure  If ill or place on antibiotics, please call to reschedule  Spoke with Kitty Alfonso at [phone] about her procedure    Hold order faxed to Dr Tori Wells at [phone]

## 2023-06-02 ENCOUNTER — TELEPHONE (OUTPATIENT)
Dept: PAIN MEDICINE | Facility: MEDICAL CENTER | Age: 88
End: 2023-06-02

## 2023-06-02 NOTE — TELEPHONE ENCOUNTER
Caller: [facility]    Doctor: Dr Guadalupe Severs     Reason for call: Office called asking if hold was received from Dr Rebekah Benjamin office I did advise office did not receive form and to fax back at [phone]    Call back#: [phone]
constant

## 2023-06-05 NOTE — TELEPHONE ENCOUNTER
Attempted to call Eliceo Covington with instructions to hold plavix, too stop last dose on 6/13/23  Too restart after injection  Left detailed MOM

## 2023-06-21 ENCOUNTER — HOSPITAL ENCOUNTER (OUTPATIENT)
Facility: AMBULARY SURGERY CENTER | Age: 88
Setting detail: OUTPATIENT SURGERY
Discharge: HOME | End: 2023-06-21
Attending: PHYSICAL MEDICINE & REHABILITATION | Admitting: PHYSICAL MEDICINE & REHABILITATION
Payer: COMMERCIAL

## 2023-06-21 ENCOUNTER — HOSPITAL ENCOUNTER (OUTPATIENT)
Dept: RADIOLOGY | Facility: HOSPITAL | Age: 88
Setting detail: OUTPATIENT SURGERY
Discharge: HOME | End: 2023-06-21
Payer: COMMERCIAL

## 2023-06-21 VITALS
SYSTOLIC BLOOD PRESSURE: 141 MMHG | TEMPERATURE: 96.5 F | HEART RATE: 76 BPM | OXYGEN SATURATION: 97 % | DIASTOLIC BLOOD PRESSURE: 63 MMHG | RESPIRATION RATE: 18 BRPM

## 2023-06-21 DIAGNOSIS — Z92.241 S/P EPIDURAL STEROID INJECTION: ICD-10-CM

## 2023-06-21 PROCEDURE — 64483 NJX AA&/STRD TFRM EPI L/S 1: CPT | Performed by: PHYSICAL MEDICINE & REHABILITATION

## 2023-06-21 RX ORDER — DEXAMETHASONE SODIUM PHOSPHATE 10 MG/ML
INJECTION, SOLUTION INTRAMUSCULAR; INTRAVENOUS AS NEEDED
Status: DISCONTINUED | OUTPATIENT
Start: 2023-06-21 | End: 2023-06-21 | Stop reason: HOSPADM

## 2023-06-21 RX ORDER — BUPIVACAINE HYDROCHLORIDE 2.5 MG/ML
INJECTION, SOLUTION EPIDURAL; INFILTRATION; INTRACAUDAL AS NEEDED
Status: DISCONTINUED | OUTPATIENT
Start: 2023-06-21 | End: 2023-06-21 | Stop reason: HOSPADM

## 2023-06-21 RX ORDER — LIDOCAINE HYDROCHLORIDE 10 MG/ML
INJECTION, SOLUTION EPIDURAL; INFILTRATION; INTRACAUDAL; PERINEURAL AS NEEDED
Status: DISCONTINUED | OUTPATIENT
Start: 2023-06-21 | End: 2023-06-21 | Stop reason: HOSPADM

## 2023-06-21 NOTE — OP NOTE
OPERATIVE REPORT  PATIENT NAME: Jocelyne Tran    :  1926  MRN: [id number]  Pt Location: [facility] MINOR/PAIN ROOM 01    SURGERY DATE: 2023    Surgeon(s) and Role:     * DO Shyam Vora Primary    Preop Diagnosis:  Intervertebral disc disorder with radiculopathy of lumbosacral region [M51 17]    Post-Op Diagnosis Codes:     * Intervertebral disc disorder with radiculopathy of lumbosacral region [M51 17]    Procedure(s):  Bilateral - L5-S1 TRANSFORAMINAL epidural steroid injection (63104-20)    Indication: Leg pain  Preoperative diagnosis: Lumbar radiculitis  Postoperative diagnosis: Lumbar radiculitis  Procedure: Fluoroscopically-guided bilateral L5-S1 transforaminal epidural steroid injection under fluoroscopy  EBL: none  Specimens: not applicable  After discussing the risks, benefits, and alternatives to the procedure, the patient expressed understanding and wished to proceed  The patient was brought to the fluoroscopy suite and placed in the prone position  A procedural pause was conducted to verify: correct patient identity, procedure to be performed and as applicable, correct side and site, correct patient position, and availability of implants, special equipment and special requirements  After identifying the right L5 pedicle fluoroscopically with an oblique view, the skin was sterilely prepped and draped in the usual fashion using Chloraprep skin prep  The skin and subcutaneous tissues were anesthetized with 1% lidocaine  A 5 inch 22-gauge  spinal needle was then advanced under fluoroscopic guidance to the neural foramen  Appropriate foraminal depth was determined with a lateral fluoroscopic view, and AP visualization confirmed needle positioning at approximately the 6 o'clock position relative to the pedicle   After negative aspiration, Omnipaque 240 contrast was injected using live fluoroscopy confirming appropriate transforaminal spread without evidence of intravascular or intrathecal uptake  Next, a 1 5 ml solution consisting of 5 mg of dexamethasone and 0 25% bupivacaine was injected slowly and incrementally into the epidural space  Following the injection the needle was withdrawn slightly and flushed with lidocaine as it was fully extracted  The procedure was then repeated in the exact same way on the opposite side at the same level  The patient tolerated the procedure well and there were no apparent complications  The patient did not develop any new neurologic deficits  After appropriate observation, the patient was dismissed from the clinic in good condition under their own power            SIGNATURE: Charles Abbott DO  DATE: June 21, 2023  TIME: 10:52 AM

## 2023-06-21 NOTE — DISCHARGE INSTRUCTIONS
Epidural Steroid Injection   WHAT YOU NEED TO KNOW:   An epidural steroid injection (JEFF) is a procedure to inject steroid medicine into the epidural space  The epidural space is between your spinal cord and vertebrae  Steroids reduce inflammation and fluid buildup in your spine that may be causing pain  You may be given pain medicine along with the steroids  ACTIVITY  Do not drive or operate machinery today  No strenuous activity today - bending, lifting, etc   You may resume normal activites starting tomorrow - start slowly and as tolerated  You may shower today, but no tub baths or hot tubs  You may have numbness for several hours from the local anesthetic  Please use caution and common sense, especially with weight-bearing activities  CARE OF THE INJECTION SITE  If you have soreness or pain, apply ice to the area today (20 minutes on/20 minutes off)  Starting tomorrow, you may use warm, moist heat or ice if needed  You may have an increase or change in your discomfort for 36-48 hours after your treatment  Apply ice and continue with any pain medication you have been prescribed  Notify the [facility] if you have any of the following: redness, drainage, swelling, headache, stiff neck or fever above 100°F     SPECIAL INSTRUCTIONS  Our office will contact you in approximately 7 days for a progress report  MEDICATIONS  Continue to take all routine medications  Our office may have instructed you to hold some medications  As no general anesthesia was used in today's procedure, you should not experience any side effects related to anesthesia  If you are diabetic, the steroids used in today's injection may temporarily increase your blood sugar levels after the first few days after your injection  Please keep a close eye on your sugars and alert the doctor who manages your diabetes if your sugars are significantly high from your baseline or you are symptomatic       If you have a problem specifically related to your procedure, please call our office at [phone]  Problems not related to your procedure should be directed to your primary care physician

## 2023-06-28 ENCOUNTER — TELEPHONE (OUTPATIENT)
Dept: PAIN MEDICINE | Facility: CLINIC | Age: 88
End: 2023-06-28

## 2023-06-30 NOTE — TELEPHONE ENCOUNTER
Patient's daughter Reports      10   %     improvement post injection    Pain Level   7/10       Patient is aware we will call back next week for an update

## 2023-07-28 ENCOUNTER — TELEPHONE (OUTPATIENT)
Dept: PAIN MEDICINE | Facility: CLINIC | Age: 88
End: 2023-07-28

## 2023-07-28 NOTE — TELEPHONE ENCOUNTER
Caller: pts daughter    Doctor: perez    Reason for call: wants to schedule another procedure.     Call back#: [phone]

## 2023-07-31 ENCOUNTER — OFFICE VISIT (OUTPATIENT)
Dept: PAIN MEDICINE | Facility: CLINIC | Age: 88
End: 2023-07-31
Payer: COMMERCIAL

## 2023-07-31 VITALS — TEMPERATURE: 98 F | SYSTOLIC BLOOD PRESSURE: 140 MMHG | DIASTOLIC BLOOD PRESSURE: 72 MMHG | HEART RATE: 78 BPM

## 2023-07-31 DIAGNOSIS — M54.16 LUMBAR RADICULOPATHY: ICD-10-CM

## 2023-07-31 DIAGNOSIS — G62.9 NEUROPATHY: ICD-10-CM

## 2023-07-31 DIAGNOSIS — G89.4 CHRONIC PAIN SYNDROME: Primary | ICD-10-CM

## 2023-07-31 PROCEDURE — 99214 OFFICE O/P EST MOD 30 MIN: CPT

## 2023-07-31 RX ORDER — DULOXETIN HYDROCHLORIDE 20 MG/1
20 CAPSULE, DELAYED RELEASE ORAL DAILY
COMMUNITY

## 2023-07-31 NOTE — PROGRESS NOTES
Pain Medicine Follow-Up Note    Assessment:  1. Chronic pain syndrome    2. Lumbar radiculopathy    3. Neuropathy        Plan:    New Medications Ordered This Visit   Medications   • DULoxetine (CYMBALTA) 20 mg capsule     Sig: Take 20 mg by mouth daily       My impressions and treatment recommendations were discussed in detail with the patient who verbalized understanding and had no further questions. Patient presents the office following a after receiving a bilateral L5-S1 transforaminal epidural steroid injection that she received on 6/21/2023. Patient reports that she received approximately 1 month relief of her pain symptoms however starting a week ago she has been getting up in the middle of the night with excruciating pain that causes her toes to curl. Patient reports that Tylenol does help this pain however it takes a while before it becomes effective. Patient resides at an assisted living called [facility] in [facility] and is accompanied by her daughter today. I advised that the patient trial Tylenol arthritis continuous release in hopes of this providing her consistent pain relief throughout the night. If patient would also like a repeat injection since they have been beneficial before in the past I informed her that she will need to wait until the end of September. Patient is agreeable to waiting. Patient was unable to tolerate gabapentin or Lyrica and recently started duloxetine from another provider which the patient finds helpful. Complete risks and benefits including bleeding, infection, tissue reaction, nerve injury and allergic reaction were discussed. The approach was demonstrated using models and literature was provided. Verbal and written consent was obtained. Follow-up is planned in 4 weeks after injection time or sooner as warranted. Discharge instructions were provided. I personally saw and examined the patient and I agree with the above discussed plan of care.     History of Present Illness:    Dian Hodgkins is a 80 y.o. female who presents to [facility] for interval re-evaluation of the above stated pain complaints. The patient has a past medical and chronic pain history as outlined in the assessment section. She was last seen on 6/21/2023. At today's visit patient states that their pain symptoms are worse with a pain score of 8/10 on the verbal numeric pain scale. The patient's pain is worse at night. The patient's pain is intermittent in nature. And the quality of the patient's pain is described as burning, sharp, throbbing, pressure-like, and pins-and-needles. The patient's pain is located in the bilateral buttocks radiating down the patient's bilateral legs. Patient was trialing pregabalin which made her sleepy therefore she discontinued the medication    Other than as stated above, the patient denies any interval changes in medications, medical condition, mental condition, symptoms, or allergies since the last office visit. Review of Systems:    Review of Systems   Constitutional: Negative for unexpected weight change. HENT: Negative for ear pain. Eyes: Negative for visual disturbance. Respiratory: Positive for shortness of breath. Negative for wheezing. Gastrointestinal: Negative for abdominal pain. Musculoskeletal: Positive for back pain, gait problem and joint swelling. Decreased ROM, joint stiffness,    Neurological: Positive for weakness. Negative for numbness. Psychiatric/Behavioral: Positive for dysphoric mood and sleep disturbance. Negative for decreased concentration. The patient is nervous/anxious. Past Medical History:   Diagnosis Date   • Hypertension        Past Surgical History:   Procedure Laterality Date   • EPIDURAL BLOCK INJECTION Left 07/14/2022    Procedure: L5 S1 LUMBAR EPIDURAL STEROID INJECTION (50373);   Surgeon: Justice Fraga MD;  Location: [facility] MAIN OR;  Service: Pain Management • EPIDURAL BLOCK INJECTION Right 10/21/2022    Procedure: BLOCK / INJECTION EPIDURAL STEROID TRANSFORAMINAL  Right L5/S1 TFESI;  Surgeon: Al Ndiaye MD;  Location: [address] [place] [address] MAIN OR;  Service: Pain Management    • EPIDURAL BLOCK INJECTION Right 12/7/2022    Procedure: BLOCK / INJECTION EPIDURAL STEROID TRANSFORAMINAL Right L5-S1;  Surgeon: Ruth Apple DO;  Location: [address] [place] [address] MAIN OR;  Service: Pain Management    • EPIDURAL BLOCK INJECTION Bilateral 6/21/2023    Procedure: L5-S1 TRANSFORAMINAL epidural steroid injection (88204-34);   Surgeon: Ruth Apple DO;  Location: [facility] MAIN OR;  Service: Pain Management    • SD INJECT SI JOINT ARTHRGRPHY&/ANES/STEROID W/CIARRA Left 12/28/2022    Procedure: BLOCK / INJECTION SACROILIAC Left SI joint injection;  Surgeon: Ruth Apple DO;  Location: [facility] MAIN OR;  Service: Pain Management    • WISDOM TOOTH EXTRACTION Bilateral        Family History   Problem Relation Age of Onset   • No Known Problems Mother    • No Known Problems Father        Social History     Occupational History   • Not on file   Tobacco Use   • Smoking status: Never   • Smokeless tobacco: Never   Vaping Use   • Vaping Use: Never used   Substance and Sexual Activity   • Alcohol use: Not Currently     Alcohol/week: 1.0 standard drink of alcohol     Types: 1 Glasses of wine per week   • Drug use: Never   • Sexual activity: Not Currently         Current Outpatient Medications:   •  amiodarone 100 mg tablet, Take 100 mg by mouth daily, Disp: , Rfl:   •  bumetanide (BUMEX) 0.5 MG tablet, Take 0.5 mg by mouth daily, Disp: , Rfl:   •  cholecalciferol (VITAMIN D3) 1,000 units tablet, Take 1,000 Units by mouth daily, Disp: , Rfl:   •  clopidogrel (PLAVIX) 75 mg tablet, Take 75 mg by mouth daily, Disp: , Rfl:   •  cyanocobalamin (VITAMIN B-12) 100 mcg tablet, Take by mouth daily, Disp: , Rfl:   •  DULoxetine (CYMBALTA) 20 mg capsule, Take 20 mg by mouth daily, Disp: , Rfl:   •  furosemide (LASIX) 40 mg tablet, Take 40 mg by mouth 2 (two) times a day, Disp: , Rfl:   •  vitamin E, tocopherol, 400 units capsule, Take 400 Units by mouth daily, Disp: , Rfl:     Allergies   Allergen Reactions   • Aspirin Rash   • Codeine Rash   • Penicillins Rash   • Sulfa Antibiotics Rash       Physical Exam:    /72   Pulse 78   Temp 98 °F (36.7 °C)     Constitutional:normal, well developed, well nourished, alert, in no distress and non-toxic and no overt pain behavior. Eyes:anicteric  HEENT:grossly intact  Neck:supple, symmetric, trachea midline and no masses   Pulmonary:even and unlabored  Cardiovascular:No edema or pitting edema present  Skin:Normal without rashes or lesions and well hydrated  Psychiatric:Mood and affect appropriate  Neurologic:Cranial Nerves II-XII grossly intact  Musculoskeletal:antalgic ambulates with a rollator. Lumbar Spine Exam    Appearance:  Normal lordosis  Palpation/Tenderness:  left lumbar paraspinal tenderness  right lumbar paraspinal tenderness  left sacroiliac joint tenderness  right sacroiliac joint tenderness  Special Tests:  Right slump test: Positive  Left slump test: Positive      This document was created using speech voice recognition software. Grammatical errors, random word insertions, pronoun errors, and incomplete sentences are an occasional consequence of this system due to software limitations, ambient noise, and hardware issues. Any formal questions or concerns about content, text, or information contained within the body of this dictation should be directly addressed to the provider for clarification.

## 2023-07-31 NOTE — PATIENT INSTRUCTIONS
Recommend up dated bloodwork to check vitamin b levels, electrolytes due to increase neuropathy     Recommend repeat R L5-S1 TFESI in September (possibly sooner, need to discuss with Dr. Pratima Sheikh    Try tylenol extended release 650 mg up to 3 times a day. Epidural Steroid Injection, Ambulatory Care   GENERAL INFORMATION:   What do I need to know about an epidural steroid injection? An epidural steroid injection (JEFF) is a procedure to inject steroid medicine into the epidural space. The epidural space is between your spinal cord and vertebrae. Steroids reduce inflammation and fluid buildup in your spine that may be causing pain. You may be given pain medicine along with the steroids. How do I prepare for an JEFF? Your healthcare provider will talk to you about how to prepare for your procedure. He will tell you what medicines to take or not take on the day of your procedure. You may need to stop taking blood thinners or other medicines several days before your procedure. You may need to adjust any diabetes medicine you take on the day of your procedure. Steroid medicine can increase your blood sugar level. What will happen during an JEFF? You will be given medicine to numb the procedure area. You will be awake for the procedure, but you will not feel pain. You may also be given medicine to help you relax during the procedure. Contrast liquid will be used to help your healthcare provider see the area better. Tell the healthcare provider if you have ever had an allergic reaction to contrast liquid. Your healthcare provider may place the needle into your neck area, middle of your back, or tailbone area. He may inject the medicine next to the nerves that are causing your pain. He may instead inject the medicine into a larger area of the epidural space. This helps the medicine spread to more nerves. Your healthcare provider will use a fluoroscope to help guide the needle to the right place.  A fluoroscope is a type of x-ray. After the procedure, a bandage will be placed over the injection site to prevent infection. What are the risks of an JEFF? You may have temporary or permanent nerve damage or paralysis. You may have bleeding or develop a serious infection, such as meningitis (swelling of the brain coverings). An abscess may also develop. You may need surgery to fix the abscess. You may have a seizure, anxiety, or trouble sleeping. If you are a man, you may have temporary erectile dysfunction (not able to have an erection). CARE AGREEMENT:   You have the right to help plan your care. Learn about your health condition and how it may be treated. Discuss treatment options with your caregivers to decide what care you want to receive. You always have the right to refuse treatment. The above information is an  only. It is not intended as medical advice for individual conditions or treatments. Talk to your doctor, nurse or pharmacist before following any medical regimen to see if it is safe and effective for you. © 2014 [facility] is for End User's use only and may not be sold, redistributed or otherwise used for commercial purposes. All illustrations and images included in CareNotes® are the copyrighted property of [employer] Inc[employer] or [employer].

## 2023-08-02 ENCOUNTER — TELEPHONE (OUTPATIENT)
Dept: PAIN MEDICINE | Facility: CLINIC | Age: 88
End: 2023-08-02

## 2023-08-02 NOTE — TELEPHONE ENCOUNTER
Scheduled patient for TFESI 9/20/23  Patient is taking Plavix  Nothing to eat or drink 1 hour prior to procedure  Needs to arrange transportation  Proper clothing for procedure  No vaccines 2 weeks prior or after procedure  If ill or place on antibiotics, please call to reschedule    Hold order was faxed to Dr Paramjit Augustin @ [phone]

## 2023-08-02 NOTE — TELEPHONE ENCOUNTER
----- Message from Rod Thomas, [address] [place] [address] sent at 8/2/2023  8:39 AM EDT -----  The patient needs to be scheduled after September 20 since she just received the injection on 6/21 FYI

## 2023-08-14 NOTE — TELEPHONE ENCOUNTER
Dr Aracely Randall office stated the hold order was faxed to us on 8/2/23.  I asked for it to be faxed again

## 2023-08-14 NOTE — TELEPHONE ENCOUNTER
Fyi... S/w pt daughter Emre Vera, as per SARATH, and advised LD 9/12/23 and HS 9/13/23 to be resumed post proc. Pts daughter verbalized understanding and aware of proc instruct.

## 2023-09-20 ENCOUNTER — HOSPITAL ENCOUNTER (OUTPATIENT)
Facility: AMBULARY SURGERY CENTER | Age: 88
Setting detail: OUTPATIENT SURGERY
Discharge: HOME | End: 2023-09-20
Attending: PHYSICAL MEDICINE & REHABILITATION | Admitting: PHYSICAL MEDICINE & REHABILITATION
Payer: COMMERCIAL

## 2023-09-20 ENCOUNTER — APPOINTMENT (OUTPATIENT)
Dept: RADIOLOGY | Facility: HOSPITAL | Age: 88
End: 2023-09-20
Payer: COMMERCIAL

## 2023-09-20 VITALS
OXYGEN SATURATION: 98 % | DIASTOLIC BLOOD PRESSURE: 68 MMHG | HEART RATE: 86 BPM | SYSTOLIC BLOOD PRESSURE: 137 MMHG | RESPIRATION RATE: 18 BRPM | TEMPERATURE: 96.9 F

## 2023-09-20 PROCEDURE — 64483 NJX AA&/STRD TFRM EPI L/S 1: CPT | Performed by: PHYSICAL MEDICINE & REHABILITATION

## 2023-09-20 RX ORDER — METHYLPREDNISOLONE ACETATE 80 MG/ML
INJECTION, SUSPENSION INTRA-ARTICULAR; INTRALESIONAL; INTRAMUSCULAR; SOFT TISSUE AS NEEDED
Status: DISCONTINUED | OUTPATIENT
Start: 2023-09-20 | End: 2023-09-20 | Stop reason: HOSPADM

## 2023-09-20 RX ORDER — LIDOCAINE HYDROCHLORIDE 10 MG/ML
INJECTION, SOLUTION EPIDURAL; INFILTRATION; INTRACAUDAL; PERINEURAL AS NEEDED
Status: DISCONTINUED | OUTPATIENT
Start: 2023-09-20 | End: 2023-09-20 | Stop reason: HOSPADM

## 2023-09-20 RX ORDER — BUPIVACAINE HYDROCHLORIDE 2.5 MG/ML
INJECTION, SOLUTION EPIDURAL; INFILTRATION; INTRACAUDAL AS NEEDED
Status: DISCONTINUED | OUTPATIENT
Start: 2023-09-20 | End: 2023-09-20 | Stop reason: HOSPADM

## 2023-09-20 NOTE — OP NOTE
OPERATIVE REPORT  PATIENT NAME: Carlos Marin    :  1926  MRN: [id number]  Pt Location: [facility] MINOR/PAIN ROOM 01    SURGERY DATE: 2023    Surgeon(s) and Role:     * DO Shyam Perez Primary    Preop Diagnosis:  Lumbar radiculopathy [M54.16]    Post-Op Diagnosis Codes:     * Lumbar radiculopathy [M54.16]    Procedure(s):  Bilateral - BLOCK / INJECTION EPIDURAL STEROID TRANSFORAMINAL L5. S1 ([id number])    Indication: Leg pain  Preoperative diagnosis: Lumbar radiculitis  Postoperative diagnosis: Lumbar radiculitis  Procedure: Fluoroscopically-guided bilateral L5-S1 transforaminal epidural steroid injection under fluoroscopy  EBL: none  Specimens: not applicable  After discussing the risks, benefits, and alternatives to the procedure, the patient expressed understanding and wished to proceed. The patient was brought to the fluoroscopy suite and placed in the prone position. A procedural pause was conducted to verify: correct patient identity, procedure to be performed and as applicable, correct side and site, correct patient position, and availability of implants, special equipment and special requirements. After identifying the right L5 pedicle fluoroscopically with an oblique view, the skin was sterilely prepped and draped in the usual fashion using Chloraprep skin prep. The skin and subcutaneous tissues were anesthetized with 1% lidocaine. A 3.5 inch 22-gauge  spinal needle was then advanced under fluoroscopic guidance to the neural foramen. Appropriate foraminal depth was determined with a lateral fluoroscopic view, and AP visualization confirmed needle positioning at approximately the 6 o'clock position relative to the pedicle. After negative aspiration, Omnipaque 240 contrast was injected using live fluoroscopy confirming appropriate transforaminal spread without evidence of intravascular or intrathecal uptake.   Next, a 1.5 ml solution consisting of 20 mg Depo-Medrol and 0.25% bupivacaine was injected slowly and incrementally into the epidural space. Following the injection the needle was withdrawn slightly and flushed with lidocaine as it was fully extracted. The procedure was then repeated in the exact same way on the opposite side at the same level. The patient tolerated the procedure well and there were no apparent complications. The patient did not develop any new neurologic deficits. After appropriate observation, the patient was dismissed from the clinic in good condition under their own power.         SIGNATURE: Drew Rooney DO  DATE: September 20, 2023  TIME: 8:59 AM

## 2023-09-20 NOTE — DISCHARGE INSTRUCTIONS
Epidural Steroid Injection   WHAT YOU NEED TO KNOW:   An epidural steroid injection (JEFF) is a procedure to inject steroid medicine into the epidural space. The epidural space is between your spinal cord and vertebrae. Steroids reduce inflammation and fluid buildup in your spine that may be causing pain. You may be given pain medicine along with the steroids. ACTIVITY  Do not drive or operate machinery today. No strenuous activity today - bending, lifting, etc.  You may resume normal activites starting tomorrow - start slowly and as tolerated. You may shower today, but no tub baths or hot tubs. You may have numbness for several hours from the local anesthetic. Please use caution and common sense, especially with weight-bearing activities. CARE OF THE INJECTION SITE  If you have soreness or pain, apply ice to the area today (20 minutes on/20 minutes off). Starting tomorrow, you may use warm, moist heat or ice if needed. You may have an increase or change in your discomfort for 36-48 hours after your treatment. Apply ice and continue with any pain medication you have been prescribed. Notify the [facility] if you have any of the following: redness, drainage, swelling, headache, stiff neck or fever above 100°F.    SPECIAL INSTRUCTIONS  Our office will contact you in approximately 7 days for a progress report. MEDICATIONS  Continue to take all routine medications. Our office may have instructed you to hold some medications. As no general anesthesia was used in today's procedure, you should not experience any side effects related to anesthesia. If you are diabetic, the steroids used in today's injection may temporarily increase your blood sugar levels after the first few days after your injection. Please keep a close eye on your sugars and alert the doctor who manages your diabetes if your sugars are significantly high from your baseline or you are symptomatic.      If you have a problem specifically related to your procedure, please call our office at [phone]. Problems not related to your procedure should be directed to your primary care physician.

## 2023-09-27 ENCOUNTER — TELEPHONE (OUTPATIENT)
Dept: PAIN MEDICINE | Facility: CLINIC | Age: 88
End: 2023-09-27

## 2023-10-03 NOTE — TELEPHONE ENCOUNTER
Caller: Eunice(daughter)  Doctor/office: dr. todd  CB#: [phone]    % of improvement: 100%  Pain Scale (1-10): 0/10

## 2023-10-18 ENCOUNTER — TELEPHONE (OUTPATIENT)
Age: 88
End: 2023-10-18

## 2023-10-18 NOTE — TELEPHONE ENCOUNTER
Caller: Assistant living     Doctor: Elodia Barrios    Reason for call: Nurse called to inform that Ms. Iris Redding passed away early this morning, and wanted to thank the staff for everything they have done for her.     Call back#:

## (undated) DEVICE — CHLORAPREP APPLICATOR TINTED 10.5ML ONE-STEP

## (undated) DEVICE — SYRINGE 10ML LL

## (undated) DEVICE — PLASTIC ADHESIVE BANDAGE: Brand: CURITY

## (undated) DEVICE — RADIOLOGY STERILE LABELS: Brand: CENTURION

## (undated) DEVICE — TOWEL SET X-RAY

## (undated) DEVICE — GLOVE SRG BIOGEL 7.5

## (undated) DEVICE — SYRINGE 3ML LL

## (undated) DEVICE — Device: Brand: PORTEX

## (undated) DEVICE — SMALL NEEDLE COUNTER NEST

## (undated) DEVICE — NEEDLE SPINAL 22G X 3.5IN  QUINCKE

## (undated) DEVICE — TRAY EPID CONT PERIFIX 18G X 3.5IN 5ML CLSD TIP DRAPE

## (undated) DEVICE — NEEDLE SPINAL 22G X 3.5 IN PLST HUB

## (undated) DEVICE — SYRINGE 5ML LL

## (undated) DEVICE — WIPES BABY PAMPERS SENSITIVE 36/PK

## (undated) DEVICE — BRACHIAL PLEXUS SET

## (undated) DEVICE — SKIN MARKER DUAL TIP WITH RULER CAP, FLEXIBLE RULER AND LABELS: Brand: DEVON

## (undated) DEVICE — NEEDLE SPINAL 22G X 5IN QUINCKE

## (undated) DEVICE — TRAY EPIDURAL PERIFIX 20GA X 3.5IN TUOHY 8ML

## (undated) DEVICE — NEEDLE BLUNT 18 G X 1 1/2 W FILTER

## (undated) DEVICE — IV SET EXT SM BORE CARESITE 8IN

## (undated) DEVICE — NEEDLE SPINAL18G X 3.5 IN QUINCKE